# Patient Record
Sex: FEMALE | Race: WHITE | Employment: UNEMPLOYED | URBAN - METROPOLITAN AREA
[De-identification: names, ages, dates, MRNs, and addresses within clinical notes are randomized per-mention and may not be internally consistent; named-entity substitution may affect disease eponyms.]

---

## 2022-01-23 ENCOUNTER — HOSPITAL ENCOUNTER (EMERGENCY)
Age: 87
Discharge: HOME | End: 2022-01-23
Attending: EMERGENCY MEDICINE
Payer: MEDICARE

## 2022-01-23 ENCOUNTER — APPOINTMENT (OUTPATIENT)
Dept: CT IMAGING | Age: 87
End: 2022-01-23
Payer: MEDICARE

## 2022-01-23 ENCOUNTER — APPOINTMENT (OUTPATIENT)
Dept: GENERAL RADIOLOGY | Age: 87
End: 2022-01-23
Payer: MEDICARE

## 2022-01-23 VITALS
SYSTOLIC BLOOD PRESSURE: 90 MMHG | HEART RATE: 75 BPM | TEMPERATURE: 97.4 F | OXYGEN SATURATION: 93 % | RESPIRATION RATE: 22 BRPM | DIASTOLIC BLOOD PRESSURE: 47 MMHG

## 2022-01-23 DIAGNOSIS — N30.01 ACUTE CYSTITIS WITH HEMATURIA: Primary | ICD-10-CM

## 2022-01-23 LAB
ALBUMIN SERPL-MCNC: 3.1 G/DL (ref 3.4–5)
ALP BLD-CCNC: 181 U/L (ref 40–129)
ALT SERPL-CCNC: 10 U/L (ref 10–40)
ANION GAP SERPL CALCULATED.3IONS-SCNC: 17 MMOL/L (ref 3–16)
AST SERPL-CCNC: 19 U/L (ref 15–37)
BACTERIA: ABNORMAL /HPF
BASOPHILS ABSOLUTE: 0 K/UL (ref 0–0.2)
BASOPHILS RELATIVE PERCENT: 0.2 %
BILIRUB SERPL-MCNC: 0.4 MG/DL (ref 0–1)
BILIRUBIN DIRECT: <0.2 MG/DL (ref 0–0.3)
BILIRUBIN URINE: NEGATIVE
BILIRUBIN, INDIRECT: ABNORMAL MG/DL (ref 0–1)
BLOOD, URINE: ABNORMAL
BUN BLDV-MCNC: 15 MG/DL (ref 7–20)
C-REACTIVE PROTEIN: 54.4 MG/L (ref 0–5.1)
CALCIUM SERPL-MCNC: 8.4 MG/DL (ref 8.3–10.6)
CHLORIDE BLD-SCNC: 100 MMOL/L (ref 99–110)
CHP ED QC CHECK: NORMAL
CLARITY: CLEAR
CO2: 21 MMOL/L (ref 21–32)
COLOR: ABNORMAL
COMMENT UA: ABNORMAL
CREAT SERPL-MCNC: 1.1 MG/DL (ref 0.6–1.2)
EOSINOPHILS ABSOLUTE: 0.1 K/UL (ref 0–0.6)
EOSINOPHILS RELATIVE PERCENT: 0.7 %
EPITHELIAL CELLS, UA: ABNORMAL /HPF (ref 0–5)
GFR AFRICAN AMERICAN: 56
GFR NON-AFRICAN AMERICAN: 46
GLUCOSE BLD-MCNC: 114 MG/DL
GLUCOSE BLD-MCNC: 114 MG/DL (ref 70–99)
GLUCOSE BLD-MCNC: 169 MG/DL (ref 70–99)
GLUCOSE URINE: NEGATIVE MG/DL
HCT VFR BLD CALC: 30.8 % (ref 36–48)
HEMOGLOBIN: 9.1 G/DL (ref 12–16)
INR BLD: 1.15 (ref 0.88–1.12)
KETONES, URINE: NEGATIVE MG/DL
LACTIC ACID: 3.4 MMOL/L (ref 0.4–2)
LEUKOCYTE ESTERASE, URINE: ABNORMAL
LIPASE: 8 U/L (ref 13–60)
LYMPHOCYTES ABSOLUTE: 1.2 K/UL (ref 1–5.1)
LYMPHOCYTES RELATIVE PERCENT: 15.7 %
MAGNESIUM: 1.6 MG/DL (ref 1.8–2.4)
MCH RBC QN AUTO: 21 PG (ref 26–34)
MCHC RBC AUTO-ENTMCNC: 29.5 G/DL (ref 31–36)
MCV RBC AUTO: 71.4 FL (ref 80–100)
MICROSCOPIC EXAMINATION: YES
MONOCYTES ABSOLUTE: 0.2 K/UL (ref 0–1.3)
MONOCYTES RELATIVE PERCENT: 3.1 %
NEUTROPHILS ABSOLUTE: 6.2 K/UL (ref 1.7–7.7)
NEUTROPHILS RELATIVE PERCENT: 80.3 %
NITRITE, URINE: POSITIVE
PDW BLD-RTO: 16.9 % (ref 12.4–15.4)
PERFORMED ON: ABNORMAL
PH UA: 6 (ref 5–8)
PLATELET # BLD: 349 K/UL (ref 135–450)
PMV BLD AUTO: 7.7 FL (ref 5–10.5)
POTASSIUM REFLEX MAGNESIUM: 3.1 MMOL/L (ref 3.5–5.1)
PRO-BNP: 754 PG/ML (ref 0–449)
PROCALCITONIN: 0.09 NG/ML (ref 0–0.15)
PROTEIN UA: ABNORMAL MG/DL
PROTHROMBIN TIME: 13.1 SEC (ref 9.9–12.7)
RAPID INFLUENZA  B AGN: NEGATIVE
RAPID INFLUENZA A AGN: NEGATIVE
RBC # BLD: 4.32 M/UL (ref 4–5.2)
RBC UA: ABNORMAL /HPF (ref 0–4)
REASON FOR REJECTION: NORMAL
REJECTED TEST: NORMAL
RENAL EPITHELIAL, UA: ABNORMAL /HPF (ref 0–1)
SARS-COV-2, NAAT: NOT DETECTED
SEDIMENTATION RATE, ERYTHROCYTE: 86 MM/HR (ref 0–30)
SODIUM BLD-SCNC: 138 MMOL/L (ref 136–145)
SPECIFIC GRAVITY UA: 1.01 (ref 1–1.03)
TOTAL PROTEIN: 6.8 G/DL (ref 6.4–8.2)
TROPONIN: <0.01 NG/ML
URINE REFLEX TO CULTURE: YES
URINE TYPE: ABNORMAL
UROBILINOGEN, URINE: 1 E.U./DL
WBC # BLD: 7.7 K/UL (ref 4–11)
WBC UA: ABNORMAL /HPF (ref 0–5)

## 2022-01-23 PROCEDURE — 71045 X-RAY EXAM CHEST 1 VIEW: CPT

## 2022-01-23 PROCEDURE — 84145 PROCALCITONIN (PCT): CPT

## 2022-01-23 PROCEDURE — 96375 TX/PRO/DX INJ NEW DRUG ADDON: CPT

## 2022-01-23 PROCEDURE — 96365 THER/PROPH/DIAG IV INF INIT: CPT

## 2022-01-23 PROCEDURE — 83605 ASSAY OF LACTIC ACID: CPT

## 2022-01-23 PROCEDURE — 81001 URINALYSIS AUTO W/SCOPE: CPT

## 2022-01-23 PROCEDURE — 70450 CT HEAD/BRAIN W/O DYE: CPT

## 2022-01-23 PROCEDURE — 80076 HEPATIC FUNCTION PANEL: CPT

## 2022-01-23 PROCEDURE — 86140 C-REACTIVE PROTEIN: CPT

## 2022-01-23 PROCEDURE — 87086 URINE CULTURE/COLONY COUNT: CPT

## 2022-01-23 PROCEDURE — 87186 SC STD MICRODIL/AGAR DIL: CPT

## 2022-01-23 PROCEDURE — 87088 URINE BACTERIA CULTURE: CPT

## 2022-01-23 PROCEDURE — 2580000003 HC RX 258: Performed by: PHYSICIAN ASSISTANT

## 2022-01-23 PROCEDURE — 80048 BASIC METABOLIC PNL TOTAL CA: CPT

## 2022-01-23 PROCEDURE — 36415 COLL VENOUS BLD VENIPUNCTURE: CPT

## 2022-01-23 PROCEDURE — 93005 ELECTROCARDIOGRAM TRACING: CPT | Performed by: PHYSICIAN ASSISTANT

## 2022-01-23 PROCEDURE — 6360000002 HC RX W HCPCS: Performed by: PHYSICIAN ASSISTANT

## 2022-01-23 PROCEDURE — 85610 PROTHROMBIN TIME: CPT

## 2022-01-23 PROCEDURE — 83735 ASSAY OF MAGNESIUM: CPT

## 2022-01-23 PROCEDURE — 84484 ASSAY OF TROPONIN QUANT: CPT

## 2022-01-23 PROCEDURE — 72125 CT NECK SPINE W/O DYE: CPT

## 2022-01-23 PROCEDURE — 85652 RBC SED RATE AUTOMATED: CPT

## 2022-01-23 PROCEDURE — 83690 ASSAY OF LIPASE: CPT

## 2022-01-23 PROCEDURE — 87040 BLOOD CULTURE FOR BACTERIA: CPT

## 2022-01-23 PROCEDURE — 85025 COMPLETE CBC W/AUTO DIFF WBC: CPT

## 2022-01-23 PROCEDURE — 96366 THER/PROPH/DIAG IV INF ADDON: CPT

## 2022-01-23 PROCEDURE — 99284 EMERGENCY DEPT VISIT MOD MDM: CPT

## 2022-01-23 PROCEDURE — 96367 TX/PROPH/DG ADDL SEQ IV INF: CPT

## 2022-01-23 PROCEDURE — 87635 SARS-COV-2 COVID-19 AMP PRB: CPT

## 2022-01-23 PROCEDURE — 6370000000 HC RX 637 (ALT 250 FOR IP): Performed by: PHYSICIAN ASSISTANT

## 2022-01-23 PROCEDURE — 87804 INFLUENZA ASSAY W/OPTIC: CPT

## 2022-01-23 PROCEDURE — 83880 ASSAY OF NATRIURETIC PEPTIDE: CPT

## 2022-01-23 RX ORDER — MAGNESIUM SULFATE IN WATER 40 MG/ML
2000 INJECTION, SOLUTION INTRAVENOUS ONCE
Status: COMPLETED | OUTPATIENT
Start: 2022-01-23 | End: 2022-01-23

## 2022-01-23 RX ORDER — LORAZEPAM 2 MG/ML
0.5 INJECTION INTRAMUSCULAR ONCE
Status: COMPLETED | OUTPATIENT
Start: 2022-01-23 | End: 2022-01-23

## 2022-01-23 RX ORDER — CEFUROXIME AXETIL 250 MG/1
250 TABLET ORAL 2 TIMES DAILY
Qty: 20 TABLET | Refills: 0 | Status: SHIPPED | OUTPATIENT
Start: 2022-01-23 | End: 2022-02-02

## 2022-01-23 RX ADMIN — LORAZEPAM 0.5 MG: 2 INJECTION INTRAMUSCULAR; INTRAVENOUS at 18:52

## 2022-01-23 RX ADMIN — CEFTRIAXONE 1000 MG: 1 INJECTION, POWDER, FOR SOLUTION INTRAMUSCULAR; INTRAVENOUS at 17:12

## 2022-01-23 RX ADMIN — MAGNESIUM SULFATE HEPTAHYDRATE 2000 MG: 40 INJECTION, SOLUTION INTRAVENOUS at 17:57

## 2022-01-23 RX ADMIN — POTASSIUM BICARBONATE 40 MEQ: 782 TABLET, EFFERVESCENT ORAL at 18:07

## 2022-01-23 NOTE — ED PROVIDER NOTES
I independently evaluated and obtained a history and physical on Alexander Rodriguez. All diagnostic, treatment, and disposition assistants were made to myself in conjunction the advanced practice provider. For further details of this patient's emergency department encounter, please see the advanced practice provider's documentation. History: 68-year-old female with history of dementia presents from her nursing facility, [facility], for evaluation of fall that was unwitnessed. Patient also more confused than her normal baseline with dementia. Patient's son-in-law with her at bedside. Patient son-in-law states he is concerned that she may have a urinary tract infection. He states the patient is a DNR CC. Inda Brittle Physician Exam: Alert to person but not place or time, normocephalic, right forehead hematoma, no lacerations, moist mucous membranes, no hemotympanum or signs of basilar skull fracture, regular rate and rhythm, lungs clear auscultation bilaterally, abdomen soft nontender nondistended, no midline tenderness of the cervical, thoracic or lumbar spine, skin warm and dry      The Ekg interpreted by me shows  Normal sinus rhythm with a rate of 95, normal axis, , QRS 94, QTc 459, no ST elevations, nonspecific ST changes with ST depression in the anterior lateral leads and inferior leads, no evidence of acute STEMI, no prior EKG on file. Patient seen and evaluated. History and physical as above. Nontoxic and afebrile. Patient arrives for evaluation of worsening confusion and a possible fall. CT head unremarkable. Does have hematoma to forehead but no wounds that require repair. Covid negative, flu negative. Magnesium 1.6 and was repleted here in the emergency room. Potassium 3.1 and also was repleted. Lactic acid elevated at 3.4. I did see this patient with GAETANO after the work-up was completed.   Do not feel patient warrants admission as she is DNR CC and currently has treatment at an [facility] who can provide antibiotics for her UTI. Patient unfortunately had full work-up done despite being DNR CC but this was performed prior to my evaluation. Plan for discharge back to nursing facility with antibiotics.      Hayder Galloway MD  01/24/22 1929

## 2022-01-23 NOTE — ED NOTES
[facility] Paperwork-- Pt brought in from [facility] ([phone]). Per report, unwitnessed fall, ? Head injury. Report of new onset N/V/D since fall. Tested + COVID 1/11; out of isolation 1/21; PCR negative 1/22. Pt independently ambulates with walker at [facility] [place].       Kirit Rosen RN  01/23/22 0771

## 2022-01-23 NOTE — ED PROVIDER NOTES
[address] [facility]        Pt Name: Omid Jo  MRN: [id number]  [place] 10/20/1929  Date of evaluation: 1/23/2022  Provider: Casandra Lara PA-C  PCP: Cyndi Merlin  Note Started: 5:04 PM EST        I have seen and evaluated this patient with my supervising physician Jennifer Cardona MD.    Steven Danny       Chief Complaint   Patient presents with    Fall     pt from [facility] had unwitnessed fall at approx 1400 today    Altered Mental Status     pt has dementia but if altered from baseline today        HISTORY OF PRESENT ILLNESS   (Location, Timing/Onset, Context/Setting, Quality, Duration, Modifying Factors, Severity, Associated Signs and Symptoms)  Note limiting factors. Chief Complaint: Fall, slight altered from baseline with known dementia    Omid Jo is a 80 y.o. female who presents by EMS from [address] [place] [address]. Apparently had unwitnessed fall occurring about 2 PM this afternoon. They believe she is a bit altered from her baseline of dementia. However, the patient's son-in-law in room and does not indicate change from her baseline dementia. Patient however did experience a fall unwitnessed. This patient is a DNR CC. She does ambulate independently with a walker. Son-in-law indicates December while at [facility] she underwent transfusion for anemia of unknown etiology. Patient was COVID-positive at the [facility] on January 11. Out of isolation in January 21 had a PCR COVID study which resulted as negative on January 22. Nursing Notes were all reviewed and agreed with or any disagreements were addressed in the HPI. REVIEW OF SYSTEMS    (2-9 systems for level 4, 10 or more for level 5)     Review of Systems    Positives and Pertinent negatives as per HPI. Except as noted above in the ROS, all other systems were reviewed and negative.        PAST MEDICAL HISTORY   No past medical history on file. SURGICAL HISTORY   No past surgical history on file. [address]       Discharge Medication List as of 1/23/2022  9:00 PM            ALLERGIES     Nalfon [fenoprofen]    FAMILYHISTORY     No family history on file. SOCIAL HISTORY       Social History     Tobacco Use    Smoking status: Not on file    Smokeless tobacco: Not on file   Substance Use Topics    Alcohol use: Not on file    Drug use: Not on file       SCREENINGS    Sade Coma Scale  Eye Opening: To speech  Best Verbal Response: Confused  Best Motor Response: Obeys commands  [place] Coma Scale Score: 13        PHYSICAL EXAM    (up to 7 for level 4, 8 or more for level 5)     ED Triage Vitals [01/23/22 1547]   BP Temp Temp Source Pulse Resp SpO2 Height Weight   (!) 121/55 97.4 °F (36.3 °C) Oral 94 22 95 % -- --       Physical Exam  Vitals and nursing note reviewed. Constitutional:       Appearance: Normal appearance. She is well-developed and normal weight. HENT:      Head: Normocephalic and atraumatic. Right Ear: External ear normal.      Left Ear: External ear normal.   Eyes:      General: No scleral icterus. Right eye: No discharge. Left eye: No discharge. Conjunctiva/sclera: Conjunctivae normal.   Cardiovascular:      Rate and Rhythm: Normal rate and regular rhythm. Heart sounds: Normal heart sounds. Pulmonary:      Effort: Pulmonary effort is normal.      Breath sounds: Normal breath sounds. Abdominal:      General: Abdomen is flat. Bowel sounds are normal.      Palpations: Abdomen is soft. Musculoskeletal:         General: Normal range of motion. Cervical back: Normal range of motion and neck supple. Right lower leg: No edema. Left lower leg: No edema. Skin:     General: Skin is warm and dry. Neurological:      General: No focal deficit present. Mental Status: She is alert. Mental status is at baseline.    Psychiatric:         Mood and Affect: Mood normal.         Behavior: Behavior normal.         DIAGNOSTIC RESULTS   LABS:    Labs Reviewed   CBC WITH AUTO DIFFERENTIAL - Abnormal; Notable for the following components:       Result Value    Hemoglobin 9.1 (*)     Hematocrit 30.8 (*)     MCV 71.4 (*)     MCH 21.0 (*)     MCHC 29.5 (*)     RDW 16.9 (*)     All other components within normal limits    Narrative:     no blue top sent lav  Performed at:  [address] [place] [address]   Phone [phone]   BASIC METABOLIC PANEL W/ REFLEX TO MG FOR LOW K - Abnormal; Notable for the following components:    Potassium reflex Magnesium 3.1 (*)     Anion Gap 17 (*)     Glucose 169 (*)     GFR Non- 46 (*)     GFR  56 (*)     All other components within normal limits    Narrative:     CALL  Sandro Noonan [phone],  Rejected Test Name/Called to: Anna Riggs, 01/23/2022 15:02, by Aren Atkins  no blue top sent lav  Performed at:  [address] [place] [address]   Phone [phone]   HEPATIC FUNCTION PANEL - Abnormal; Notable for the following components:    Albumin 3.1 (*)     Alkaline Phosphatase 181 (*)     All other components within normal limits    Narrative:     Bonilla Forbes tel. [phone],  Rejected Test Name/Called to: Anna Riggs, 01/23/2022 15:02, by Aren Atkins  no blue top sent lav  Performed at:  [address] [facility] [place] [address]   Phone [phone]   LIPASE - Abnormal; Notable for the following components:    Lipase 8.0 (*)     All other components within normal limits    Narrative:     Bonilla Forbes tel. [phone],  Rejected Test Name/Called to: Anna Riggs, 01/23/2022 15:02, by Aren Atkins  no blue top sent lav  Performed at:  [address] [facility] [place] [address]   Phone [phone]   BRAIN NATRIURETIC PEPTIDE - Abnormal; Notable for the following components:    Pro- (*)     All other components within normal limits    Narrative:     Mervin Mercado tel. [phone],  Rejected Test Name/Called to: Pierre Moseley, 01/23/2022 15:02, by Lit Oz  no blue top sent lav  [address]formed [address]:  [address] [address]   Phone [phone]   C-REACTIVE PROTEIN - Abnormal; Notable for the following components:    CRP 54.4 (*)     All other components within normal limits    Narrative:     Mervin Mercado tel. [phone],  Rejected Test Name/Called to: Pierre Moseley, 01/23/2022 15:02, by [place]  no blue top sent lav  Performed at:  [address] [address]   Phone [phone]   URINE RT REFLEX TO CULTURE - Abnormal; Notable for the following components:    Blood, Urine TRACE-LYSED (*)     Protein, UA TRACE (*)     Nitrite, Urine POSITIVE (*)     Leukocyte Esterase, Urine SMALL (*)     All other components within normal limits    Narrative:     Performed at:  [address] [address]   Phone [phone]   LACTIC ACID, PLASMA - Abnormal; Notable for the following components:    Lactic Acid 3.4 (*)     All other components within normal limits    Narrative:     Performed at:  [address] [address]   Phone [phone]   SEDIMENTATION RATE - Abnormal; Notable for the following components:    Sed Rate 86 (*)     All other components within normal limits    Narrative:     Performed at:  [address] [address]   Phone [phone]   PROTIME-INR - Abnormal; Notable for the following components:    Protime 13.1 (*)     INR 1.15 (*)     All other components within normal limits    Narrative:     Previous sample was short, QNS  Lab draw  Performed at:  [address] [place]   Phone [phone]   MAGNESIUM - Abnormal; Notable for the following components:    Magnesium 1.60 (*)     All other components within normal limits    Narrative:     Jd Curran tel. [phone],  Rejected Test Name/Called to: Cheri Palumbo, 01/23/2022 15:02, by [employer]  no blue top sent lav  Performed at:  [address] [place]   Phone [phone]   MICROSCOPIC URINALYSIS - Abnormal; Notable for the following components:    WBC, UA 10-20 (*)     Epithelial Cells, UA 6-10 (*)     Bacteria, UA 4+ (*)     All other components within normal limits    Narrative:     Performed at:  [address] [place]   Phone [phone]   POCT GLUCOSE - Abnormal; Notable for the following components:    POC Glucose 114 (*)     All other components within normal limits    Narrative:     Performed at:  [address] [place]   Phone [phone]   POCT GLUCOSE - Normal   COVID-19, RAPID    Narrative:     Performed at:  [address] [place]   Phone [phone]   RAPID INFLUENZA A/B ANTIGENS    Narrative:     Performed at:  [address] [place]   Phone [phone]   CULTURE, BLOOD 1   CULTURE, BLOOD 2   CULTURE, URINE   TROPONIN    Narrative:     [address] [phone],  Rejected Test Name/Called to: Cheri Palumbo, 01/23/2022 15:02, by [employer]CE  no blue top sent lav  Performed at:  [address] [place]   Phone [phone]   PROCALCITONIN Narrative:     Brett Nick tel. [phone],  Rejected Test Name/Called to: Anette Ramírez, 01/23/2022 15:02, by Melida Matias  no blue top sent lav  Performed at:  [facility]  [address] [place], [place] [address]   Phone [phone]   SPECIMEN REJECTION    Narrative:     no blue top sent lav  Performed at:  [facility]  [address] [place], [place] [address]   Phone [phone]       When ordered only abnormal lab results are displayed. All other labs were within normal range or not returned as of this dictation. EKG: When ordered, EKG's are interpreted by the Emergency Department Physician in the absence of a cardiologist.  Please see their note for interpretation of EKG. RADIOLOGY:   Non-plain film images such as CT, Ultrasound and MRI are read by the radiologist. Plain radiographic images are visualized and preliminarily interpreted by the ED Provider with the below findings:        Interpretation per the Radiologist below, if available at the time of this note:    CT Head WO Contrast   Final Result   No acute intracranial abnormality. Generalized cerebral atrophy. Mild chronic small vessel white matter   ischemic change. CT Cervical Spine WO Contrast   Final Result   No acute abnormality of the cervical spine. Multilevel degenerative disc disease and multilevel facet arthropathy. Focal opacification in the right upper lobe, likely scarring. Comparison   with remote imaging would be helpful if available. Enlarged multinodular thyroid gland. Consider ultrasound follow-up given   patient's age. XR CHEST PORTABLE   Final Result   No acute abnormality.            CT Head WO Contrast    Result Date: 1/23/2022  EXAMINATION: CT OF THE HEAD WITHOUT CONTRAST  1/23/2022 3:23 pm TECHNIQUE: CT of the head was performed without the administration of intravenous contrast. Dose modulation, iterative reconstruction, and/or weight based adjustment of the mA/kV was utilized to reduce the radiation dose to as low as reasonably achievable. COMPARISON: None. HISTORY: ORDERING SYSTEM PROVIDED HISTORY: Altered mental status, history fall TECHNOLOGIST PROVIDED HISTORY: Reason for exam:->Altered mental status, history fall Has a \"code stroke\" or \"stroke alert\" been called? ->No Decision Support Exception - unselect if not a suspected or confirmed emergency medical condition->Emergency Medical Condition (MA) FINDINGS: BRAIN/VENTRICLES: There is no acute intracranial hemorrhage, mass effect or midline shift. No abnormal extra-axial fluid collection. The gray-white differentiation is maintained without evidence of an acute infarct. There is no evidence of hydrocephalus. Generalized prominence of the ventricular and cisternal spaces. Mild decreased attenuation in the periventricular and subcortical white matter most consistent with small vessel ischemic change. ORBITS: The visualized portion of the orbits demonstrate no acute abnormality. SINUSES: The visualized paranasal sinuses and mastoid air cells demonstrate no acute abnormality. SOFT TISSUES/SKULL:  No acute abnormality of the visualized skull or soft tissues. No acute intracranial abnormality. Generalized cerebral atrophy. Mild chronic small vessel white matter ischemic change. CT Cervical Spine WO Contrast    Result Date: 1/23/2022  EXAMINATION: CT OF THE CERVICAL SPINE WITHOUT CONTRAST 1/23/2022 3:23 pm TECHNIQUE: CT of the cervical spine was performed without the administration of intravenous contrast. Multiplanar reformatted images are provided for review. Dose modulation, iterative reconstruction, and/or weight based adjustment of the mA/kV was utilized to reduce the radiation dose to as low as reasonably achievable. COMPARISON: None.  HISTORY: ORDERING SYSTEM PROVIDED HISTORY: Altered mental status history of fall TECHNOLOGIST PROVIDED HISTORY: Reason for exam:->Altered mental status history of fall Decision Support Exception - unselect if not a suspected or confirmed emergency medical condition->Emergency Medical Condition (MA) FINDINGS: BONES/ALIGNMENT: There is no acute fracture or traumatic malalignment. DEGENERATIVE CHANGES: Straightened cervical lordotic curvature. Multilevel degenerative disc disease, most advanced at C4-5 through C6-7. Multilevel facet arthropathy. 3 mm anterolisthesis of C3 on C4, likely related to facet arthropathy. Mild degenerative change at the C1-2 articulation anteriorly. SOFT TISSUES: There is no prevertebral soft tissue swelling. Enlarged multinodular thyroid gland. Focal right upper lobe opacification most consistent with an area of scarring. No acute abnormality of the cervical spine. Multilevel degenerative disc disease and multilevel facet arthropathy. Focal opacification in the right upper lobe, likely scarring. Comparison with remote imaging would be helpful if available. Enlarged multinodular thyroid gland. Consider ultrasound follow-up given patient's age. XR CHEST PORTABLE    Result Date: 1/23/2022  EXAMINATION: ONE XRAY VIEW OF THE CHEST 1/23/2022 2:05 pm COMPARISON: None. HISTORY: ORDERING SYSTEM PROVIDED HISTORY: Altered mental status TECHNOLOGIST PROVIDED HISTORY: Reason for exam:->Altered mental status Reason for Exam: Altered mental status FINDINGS: No lung infiltrate or consolidation. No pneumothorax or pleural effusion. Heart size is normal.     No acute abnormality. PROCEDURES   Unless otherwise noted below, none     Procedures    CRITICAL CARE TIME   Critical Care  There was a high probability of life-threatening clinical deterioration in the patient's condition requiring my urgent intervention. Total critical care time with the patient was 45 minutes excluding separately reportable procedures.   Critical care required due to patients complaint per [facility]CF of altered mental status beyond her baseline dementia. Later confirmed at baseline per son. Patient found to have UTI with associated hypomagnesemia. CONSULTS:  None      EMERGENCY DEPARTMENT COURSE and DIFFERENTIAL DIAGNOSIS/MDM:   Vitals:    Vitals:    01/23/22 1547 01/23/22 2115   BP: (!) 121/55 (!) 90/47   Pulse: 94 75   Resp: 22 22   Temp: 97.4 °F (36.3 °C)    TempSrc: Oral    SpO2: 95% 93%       Patient was given the following medications:  Medications   cefTRIAXone (ROCEPHIN) 1000 mg IVPB in 50 mL D5W minibag (0 mg IntraVENous Stopped 1/23/22 1750)   magnesium sulfate 2000 mg in 50 mL IVPB premix (0 mg IntraVENous Stopped 1/23/22 2000)   LORazepam (ATIVAN) injection 0.5 mg (0.5 mg IntraVENous Given 1/23/22 1852)           Presenting with history of unwitnessed fall. Walks independently with a walker. Found down with walker. CT scan head and neck unremarkable. Chest x-ray negative for acute cardiopulmonary abnormality. Laboratory evaluation showing evidence of UTI with positive nitrate and small leukocyte. Did treat with Rocephin 1 g IVPB. Rapid Covid and rapid flu studies are both negative. The patient WBC 7.7 hemoglobin 9.1. Hemoglobin is at baseline. Patient renal function at baseline with a BUN 15, creatinine 1.1 and GFR 46. The patient lactic acid 3.4 likely related to the UTI which is being treated. Troponin BNP appropriate and not elevated. This patient is DNR CC. Patient at mental baseline. Patient becoming bit anxious with concern for sundowners. Ativan 0.5 mg ordered. Patient be transported back to [facility] with continuation of antibiotics. Patient will be discharged with prescription for Ceftin 250 mg twice daily x10 days. The patient's son-in-law does express understanding of the diagnosis and the treatment plan. This case was reviewed with attending physician who personally evaluated the patient. FINAL IMPRESSION      1.  Acute cystitis with hematuria          DISPOSITION/PLAN   DISPOSITION Decision To Discharge 01/23/2022 07:20:25 PM      PATIENT REFERRED TO:  Mike Winston  [address]  [address]  [address] [facility]  [phone]    Schedule an appointment as soon as possible for a visit in 1 week      [facility] Emergency Department  [address] [id number]  [phone]  Go to   If symptoms worsen      DISCHARGE MEDICATIONS:  Discharge Medication List as of 1/23/2022  9:00 PM      START taking these medications    Details   cefUROXime (CEFTIN) 250 MG tablet Take 1 tablet by mouth 2 times daily for 10 days, Disp-20 tablet, R-0Print             DISCONTINUED MEDICATIONS:  Discharge Medication List as of 1/23/2022  9:00 PM                 (Please note that portions of this note were completed with a voice recognition program.  Efforts were made to edit the dictations but occasionally words are mis-transcribed. )    Casandra Lara PA-C (electronically signed)           Casandra Lara PA-C  01/24/22 1249

## 2022-01-24 LAB
EKG ATRIAL RATE: 95 BPM
EKG DIAGNOSIS: NORMAL
EKG P AXIS: 85 DEGREES
EKG P-R INTERVAL: 184 MS
EKG Q-T INTERVAL: 366 MS
EKG QRS DURATION: 94 MS
EKG QTC CALCULATION (BAZETT): 459 MS
EKG R AXIS: 49 DEGREES
EKG T AXIS: 108 DEGREES
EKG VENTRICULAR RATE: 95 BPM

## 2022-01-24 PROCEDURE — 93010 ELECTROCARDIOGRAM REPORT: CPT | Performed by: INTERNAL MEDICINE

## 2022-01-24 NOTE — ED NOTES
Attempted to contact [facility] [place] at [phone] to give report but it says that the line in temporarily unavailable. Report given to [employer] along with d/c instructions and rx.       Gabriela Rosas RN  01/23/22 7753

## 2022-01-25 LAB
ORGANISM: ABNORMAL
URINE CULTURE, ROUTINE: ABNORMAL
URINE CULTURE, ROUTINE: ABNORMAL

## 2022-01-25 NOTE — RESULT ENCOUNTER NOTE
Culture reviewed, culture is positive and unfortunately E. coli resistant to the p.o. antibiotic the patient was prescribed at discharge is present in culture. The patient is currently DNR comfort care and was discharged to a long-term facility. That facility should be called and advised the patient has an antibiotic requiring broad-based IV antibiotics such as gentamicin or ertapenem. If that facility is able to give this patient IV antibiotics such as gentamicin or ertapenem she may remain at that facility however if they are unable to give these IV antibiotics the patient and family should consider coming back to the emergency department for appropriate IV antibiotic treatment.

## 2022-01-25 NOTE — RESULT ENCOUNTER NOTE
Patient's positive result has been appropriately evaluated by the provider pool. Patient was unable to be reached over the phone. A voicemail was left with the patient. Will await a return phone call. Will try to reach patient again tomorrow per protocol. Called nursing home x2 once was got disconnected. Second call no one answered.

## 2022-01-26 NOTE — RESULT ENCOUNTER NOTE
Patient's positive result has been appropriately evaluated by the provider pool. GERARDO Becker called for information regarding urine culture.   Information sent to NP at [phone]

## 2022-01-27 LAB
BLOOD CULTURE, ROUTINE: NORMAL
CULTURE, BLOOD 2: NORMAL

## 2022-02-16 ENCOUNTER — HOSPITAL ENCOUNTER (OUTPATIENT)
Age: 87
Setting detail: OBSERVATION
Discharge: OTHER INSTITUTION | End: 2022-02-17
Attending: EMERGENCY MEDICINE | Admitting: INTERNAL MEDICINE
Payer: MEDICARE

## 2022-02-16 ENCOUNTER — APPOINTMENT (OUTPATIENT)
Dept: CT IMAGING | Age: 87
End: 2022-02-16
Payer: MEDICARE

## 2022-02-16 DIAGNOSIS — E86.0 DEHYDRATION: ICD-10-CM

## 2022-02-16 DIAGNOSIS — N17.9 ACUTE KIDNEY INJURY (HCC): ICD-10-CM

## 2022-02-16 DIAGNOSIS — Z51.5 HOSPICE CARE: ICD-10-CM

## 2022-02-16 DIAGNOSIS — K56.609 SMALL BOWEL OBSTRUCTION (HCC): Primary | ICD-10-CM

## 2022-02-16 DIAGNOSIS — N39.0 URINARY TRACT INFECTION WITHOUT HEMATURIA, SITE UNSPECIFIED: ICD-10-CM

## 2022-02-16 DIAGNOSIS — K63.89 COLONIC MASS: ICD-10-CM

## 2022-02-16 DIAGNOSIS — R16.0 LIVER MASS: ICD-10-CM

## 2022-02-16 LAB
ALBUMIN SERPL-MCNC: 3.1 G/DL (ref 3.4–5)
ALP BLD-CCNC: 229 U/L (ref 40–129)
ALT SERPL-CCNC: 11 U/L (ref 10–40)
ANION GAP SERPL CALCULATED.3IONS-SCNC: 17 MMOL/L (ref 3–16)
AST SERPL-CCNC: 21 U/L (ref 15–37)
BASOPHILS ABSOLUTE: 0 K/UL (ref 0–0.2)
BASOPHILS RELATIVE PERCENT: 0.3 %
BILIRUB SERPL-MCNC: 0.5 MG/DL (ref 0–1)
BILIRUBIN DIRECT: <0.2 MG/DL (ref 0–0.3)
BILIRUBIN URINE: ABNORMAL
BILIRUBIN, INDIRECT: ABNORMAL MG/DL (ref 0–1)
BLOOD, URINE: NEGATIVE
BUN BLDV-MCNC: 92 MG/DL (ref 7–20)
CALCIUM SERPL-MCNC: 8.3 MG/DL (ref 8.3–10.6)
CHLORIDE BLD-SCNC: 98 MMOL/L (ref 99–110)
CLARITY: ABNORMAL
CO2: 23 MMOL/L (ref 21–32)
COARSE CASTS, UA: ABNORMAL /LPF (ref 0–2)
COLOR: YELLOW
CREAT SERPL-MCNC: 2.7 MG/DL (ref 0.6–1.2)
EOSINOPHILS ABSOLUTE: 0 K/UL (ref 0–0.6)
EOSINOPHILS RELATIVE PERCENT: 0.1 %
EPITHELIAL CELLS, UA: 3 /HPF (ref 0–5)
GFR AFRICAN AMERICAN: 20
GFR NON-AFRICAN AMERICAN: 16
GLUCOSE BLD-MCNC: 120 MG/DL
GLUCOSE BLD-MCNC: 120 MG/DL (ref 70–99)
GLUCOSE BLD-MCNC: 130 MG/DL (ref 70–99)
GLUCOSE URINE: NEGATIVE MG/DL
HCT VFR BLD CALC: 36.3 % (ref 36–48)
HEMOGLOBIN: 11.3 G/DL (ref 12–16)
KETONES, URINE: NEGATIVE MG/DL
LACTIC ACID, SEPSIS: 2.9 MMOL/L (ref 0.4–1.9)
LEUKOCYTE ESTERASE, URINE: ABNORMAL
LIPASE: 9 U/L (ref 13–60)
LYMPHOCYTES ABSOLUTE: 2 K/UL (ref 1–5.1)
LYMPHOCYTES RELATIVE PERCENT: 12.4 %
MCH RBC QN AUTO: 21.9 PG (ref 26–34)
MCHC RBC AUTO-ENTMCNC: 31.2 G/DL (ref 31–36)
MCV RBC AUTO: 70.3 FL (ref 80–100)
MICROSCOPIC EXAMINATION: YES
MONOCYTES ABSOLUTE: 0.8 K/UL (ref 0–1.3)
MONOCYTES RELATIVE PERCENT: 5.2 %
NEUTROPHILS ABSOLUTE: 13 K/UL (ref 1.7–7.7)
NEUTROPHILS RELATIVE PERCENT: 82 %
NITRITE, URINE: NEGATIVE
PDW BLD-RTO: 21.1 % (ref 12.4–15.4)
PERFORMED ON: ABNORMAL
PH UA: 5 (ref 5–8)
PLATELET # BLD: 490 K/UL (ref 135–450)
PMV BLD AUTO: 8.3 FL (ref 5–10.5)
POTASSIUM REFLEX MAGNESIUM: 4.3 MMOL/L (ref 3.5–5.1)
PROTEIN UA: NEGATIVE MG/DL
RBC # BLD: 5.17 M/UL (ref 4–5.2)
RBC UA: ABNORMAL /HPF (ref 0–4)
SODIUM BLD-SCNC: 138 MMOL/L (ref 136–145)
SPECIFIC GRAVITY UA: 1.02 (ref 1–1.03)
TOTAL PROTEIN: 7.3 G/DL (ref 6.4–8.2)
TROPONIN: 0.02 NG/ML
URINE REFLEX TO CULTURE: YES
URINE TYPE: ABNORMAL
UROBILINOGEN, URINE: 1 E.U./DL
WBC # BLD: 15.9 K/UL (ref 4–11)
WBC UA: 39 /HPF (ref 0–5)
YEAST: PRESENT /HPF

## 2022-02-16 PROCEDURE — 99283 EMERGENCY DEPT VISIT LOW MDM: CPT

## 2022-02-16 PROCEDURE — 74176 CT ABD & PELVIS W/O CONTRAST: CPT

## 2022-02-16 PROCEDURE — 93005 ELECTROCARDIOGRAM TRACING: CPT

## 2022-02-16 PROCEDURE — 96375 TX/PRO/DX INJ NEW DRUG ADDON: CPT

## 2022-02-16 PROCEDURE — 84484 ASSAY OF TROPONIN QUANT: CPT

## 2022-02-16 PROCEDURE — 6360000002 HC RX W HCPCS

## 2022-02-16 PROCEDURE — 87040 BLOOD CULTURE FOR BACTERIA: CPT

## 2022-02-16 PROCEDURE — 2580000003 HC RX 258

## 2022-02-16 PROCEDURE — 83690 ASSAY OF LIPASE: CPT

## 2022-02-16 PROCEDURE — 96365 THER/PROPH/DIAG IV INF INIT: CPT

## 2022-02-16 PROCEDURE — G0378 HOSPITAL OBSERVATION PER HR: HCPCS

## 2022-02-16 PROCEDURE — 96361 HYDRATE IV INFUSION ADD-ON: CPT

## 2022-02-16 PROCEDURE — 83605 ASSAY OF LACTIC ACID: CPT

## 2022-02-16 PROCEDURE — 81001 URINALYSIS AUTO W/SCOPE: CPT

## 2022-02-16 PROCEDURE — 70450 CT HEAD/BRAIN W/O DYE: CPT

## 2022-02-16 PROCEDURE — 87086 URINE CULTURE/COLONY COUNT: CPT

## 2022-02-16 PROCEDURE — 80076 HEPATIC FUNCTION PANEL: CPT

## 2022-02-16 PROCEDURE — 80048 BASIC METABOLIC PNL TOTAL CA: CPT

## 2022-02-16 PROCEDURE — 85025 COMPLETE CBC W/AUTO DIFF WBC: CPT

## 2022-02-16 PROCEDURE — 2580000003 HC RX 258: Performed by: INTERNAL MEDICINE

## 2022-02-16 RX ORDER — M-VIT,TX,IRON,MINS/CALC/FOLIC 27MG-0.4MG
1 TABLET ORAL DAILY
Status: ON HOLD | COMMUNITY
End: 2022-02-17 | Stop reason: HOSPADM

## 2022-02-16 RX ORDER — FENTANYL CITRATE 50 UG/ML
25 INJECTION, SOLUTION INTRAMUSCULAR; INTRAVENOUS ONCE
Status: COMPLETED | OUTPATIENT
Start: 2022-02-16 | End: 2022-02-16

## 2022-02-16 RX ORDER — LOPERAMIDE HYDROCHLORIDE 2 MG/1
2 CAPSULE ORAL 2 TIMES DAILY PRN
Status: ON HOLD | COMMUNITY
End: 2022-02-17 | Stop reason: HOSPADM

## 2022-02-16 RX ORDER — SODIUM CHLORIDE, SODIUM LACTATE, POTASSIUM CHLORIDE, AND CALCIUM CHLORIDE .6; .31; .03; .02 G/100ML; G/100ML; G/100ML; G/100ML
1000 INJECTION, SOLUTION INTRAVENOUS ONCE
Status: COMPLETED | OUTPATIENT
Start: 2022-02-16 | End: 2022-02-16

## 2022-02-16 RX ORDER — FUROSEMIDE 20 MG/1
60 TABLET ORAL DAILY
Status: ON HOLD | COMMUNITY
End: 2022-02-17 | Stop reason: HOSPADM

## 2022-02-16 RX ORDER — POTASSIUM CHLORIDE 750 MG/1
10 TABLET, EXTENDED RELEASE ORAL 2 TIMES DAILY
Status: ON HOLD | COMMUNITY
End: 2022-02-17 | Stop reason: HOSPADM

## 2022-02-16 RX ORDER — SODIUM CHLORIDE 9 MG/ML
25 INJECTION, SOLUTION INTRAVENOUS PRN
Status: DISCONTINUED | OUTPATIENT
Start: 2022-02-16 | End: 2022-02-17 | Stop reason: HOSPADM

## 2022-02-16 RX ORDER — ACETAMINOPHEN 325 MG/1
650 TABLET ORAL EVERY 6 HOURS PRN
Status: ON HOLD | COMMUNITY
End: 2022-02-17 | Stop reason: HOSPADM

## 2022-02-16 RX ORDER — SODIUM CHLORIDE 9 MG/ML
INJECTION, SOLUTION INTRAVENOUS CONTINUOUS
Status: DISCONTINUED | OUTPATIENT
Start: 2022-02-16 | End: 2022-02-17 | Stop reason: HOSPADM

## 2022-02-16 RX ORDER — PANTOPRAZOLE SODIUM 40 MG/1
40 TABLET, DELAYED RELEASE ORAL DAILY
Status: ON HOLD | COMMUNITY
End: 2022-02-17 | Stop reason: HOSPADM

## 2022-02-16 RX ORDER — MORPHINE SULFATE 2 MG/ML
2 INJECTION, SOLUTION INTRAMUSCULAR; INTRAVENOUS
Status: DISCONTINUED | OUTPATIENT
Start: 2022-02-16 | End: 2022-02-17 | Stop reason: HOSPADM

## 2022-02-16 RX ORDER — AMIODARONE HYDROCHLORIDE 200 MG/1
200 TABLET ORAL DAILY
Status: ON HOLD | COMMUNITY
End: 2022-02-17 | Stop reason: HOSPADM

## 2022-02-16 RX ORDER — SODIUM CHLORIDE, SODIUM LACTATE, POTASSIUM CHLORIDE, AND CALCIUM CHLORIDE .6; .31; .03; .02 G/100ML; G/100ML; G/100ML; G/100ML
1000 INJECTION, SOLUTION INTRAVENOUS ONCE
Status: DISCONTINUED | OUTPATIENT
Start: 2022-02-16 | End: 2022-02-17 | Stop reason: HOSPADM

## 2022-02-16 RX ORDER — FERROUS SULFATE 325(65) MG
325 TABLET ORAL
Status: ON HOLD | COMMUNITY
End: 2022-02-17 | Stop reason: HOSPADM

## 2022-02-16 RX ORDER — SODIUM CHLORIDE 0.9 % (FLUSH) 0.9 %
5-40 SYRINGE (ML) INJECTION EVERY 12 HOURS SCHEDULED
Status: DISCONTINUED | OUTPATIENT
Start: 2022-02-16 | End: 2022-02-17 | Stop reason: HOSPADM

## 2022-02-16 RX ORDER — SODIUM CHLORIDE 0.9 % (FLUSH) 0.9 %
5-40 SYRINGE (ML) INJECTION PRN
Status: DISCONTINUED | OUTPATIENT
Start: 2022-02-16 | End: 2022-02-17 | Stop reason: HOSPADM

## 2022-02-16 RX ORDER — QUETIAPINE FUMARATE 25 MG/1
25 TABLET, FILM COATED ORAL DAILY
Status: ON HOLD | COMMUNITY
End: 2022-02-17 | Stop reason: HOSPADM

## 2022-02-16 RX ORDER — QUETIAPINE FUMARATE 50 MG/1
50 TABLET, FILM COATED ORAL NIGHTLY
Status: ON HOLD | COMMUNITY
End: 2022-02-17 | Stop reason: HOSPADM

## 2022-02-16 RX ORDER — LORAZEPAM 2 MG/ML
1 INJECTION INTRAMUSCULAR EVERY 4 HOURS PRN
Status: DISCONTINUED | OUTPATIENT
Start: 2022-02-16 | End: 2022-02-17 | Stop reason: HOSPADM

## 2022-02-16 RX ORDER — ACETAMINOPHEN 325 MG/1
650 TABLET ORAL EVERY 4 HOURS PRN
Status: DISCONTINUED | OUTPATIENT
Start: 2022-02-16 | End: 2022-02-17 | Stop reason: HOSPADM

## 2022-02-16 RX ORDER — PROMETHAZINE HYDROCHLORIDE 12.5 MG/1
12.5 SUPPOSITORY RECTAL EVERY 6 HOURS PRN
Status: ON HOLD | COMMUNITY
End: 2022-02-17 | Stop reason: HOSPADM

## 2022-02-16 RX ADMIN — PIPERACILLIN SODIUM AND TAZOBACTAM SODIUM 4500 MG: 4; .5 INJECTION, POWDER, LYOPHILIZED, FOR SOLUTION INTRAVENOUS at 15:48

## 2022-02-16 RX ADMIN — SODIUM CHLORIDE, POTASSIUM CHLORIDE, SODIUM LACTATE AND CALCIUM CHLORIDE 1000 ML: 600; 310; 30; 20 INJECTION, SOLUTION INTRAVENOUS at 12:17

## 2022-02-16 RX ADMIN — FENTANYL CITRATE 25 MCG: 50 INJECTION, SOLUTION INTRAMUSCULAR; INTRAVENOUS at 12:12

## 2022-02-16 RX ADMIN — SODIUM CHLORIDE, SODIUM LACTATE, POTASSIUM CHLORIDE, AND CALCIUM CHLORIDE 1000 ML: .6; .31; .03; .02 INJECTION, SOLUTION INTRAVENOUS at 16:21

## 2022-02-16 RX ADMIN — SODIUM CHLORIDE: 9 INJECTION, SOLUTION INTRAVENOUS at 22:19

## 2022-02-16 RX ADMIN — SODIUM CHLORIDE, PRESERVATIVE FREE 10 ML: 5 INJECTION INTRAVENOUS at 22:17

## 2022-02-16 ASSESSMENT — PAIN SCALES - PAIN ASSESSMENT IN ADVANCED DEMENTIA (PAINAD)
FACIALEXPRESSION: 2
BODYLANGUAGE: 1
TOTALSCORE: 5
BREATHING: 0
NEGVOCALIZATION: 1
CONSOLABILITY: 1

## 2022-02-16 ASSESSMENT — PAIN SCALES - GENERAL: PAINLEVEL_OUTOF10: 2

## 2022-02-16 NOTE — ED NOTES
Spoke with Ana Gaitan RN at [place]. She called to ensure we knew pt was hospice pt. Contact info: [phone].    Ask for Shaneka Caldwell Team.     Brinda Rodriges RN  02/16/22 9124

## 2022-02-16 NOTE — ED PROVIDER NOTES
Attending Note:    The patient was seen and examined by the mid-level provider. I also completed a face-to-face examination. HPI: Salvador Bowman is a 80 y.o. female who presents to the emergency department with a complaint of nausea, vomiting, possible constipation. The patient is a current resident of an extended care facility and was sent here for possible enema due to constipation. She had an abdominal series done at the extended care facility which showed ileus versus small bowel obstruction. The patient has had a decline over the last several days. She is normally more communicative with some history of memory loss but has been mostly nonverbal over the last 4 days. Appetite has been decreased and she has not been eating or drinking. Oral intake has been decreased. Urine output is unknown. There is no report of any melena hematochezia. No report of any fall trauma or injury. She denies any chest pain shortness of breath. She complains of abdominal pain but is unable to describe it. She denies any back or flank pain. There is no report of any focal weakness or numbness. No cough or cold symptoms. History of aspiration. Physical Exam:     Constitutional: Mild to moderate discomfort. Mildly ill-appearing. Generally weak. Head: No visible evidence of trauma. Normocephalic. Eyes: Pupils equal and reactive. No photophobia. Conjunctiva normal.    HENT: Oral mucosa dry. Lips cracked. Airway patent. Neck:  Soft and supple. Nontender. Heart:  Regular rate and rhythm. No murmur. Sinus rhythm on the monitor. Lungs:  Clear to auscultation. No wheezes, rales, or ronchi. No conversational dyspnea or accessory muscle use. Abdomen:  Soft, distended. Diffusely tender to palpation. No guarding rigidity or rebound. Musculoskeletal: Extremities non-tender with full range of motion. Radial and dorsalis pedis pulses were equal laterally.   No calf tenderness erythema or edema. Neurological: Alert and oriented to person only. Some difficulty with communication due to hearing loss. Follows some simple commands. Answers mostly yes and no. Speech clear. No acute focal motor or sensory deficits. Skin: Skin is warm and dry. No rash. Psychiatric: Normal mood and affect. Behavior is normal.     DIAGNOSTIC RESULTS     EKG: All EKG's are interpreted by the Emergency Department Physician who either signs or Co-signs this chart in the absence of a cardiologist.    Normal sinus rhythm. Rate 86. HI interval 144 ms. QRS duration 88 ms. QTc 430 ms. R axis XX 4 degrees. No ST elevation. Nonspecific T wave abnormalities with T wave inversions in the lateral leads. EKG was very similar in comparison to a previous EKG from January 23, 2022. RADIOLOGY:   Non-plain film images such as CT, Ultrasound and MRI are read by the radiologist. Plain radiographic images are visualized and preliminarily interpreted by the emergency physician with the below findings:        Interpretation per the Radiologist below, if available at the time of this note:    CT Head WO Contrast   Final Result   No acute intracranial abnormality. No change from prior study. CT ABDOMEN PELVIS WO CONTRAST Additional Contrast? None   Final Result   Highly suspicious mass involving the ileocecal junction with surrounding   nodularity presumed metastatic localized adenopathy. 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 In addition, this likely accounts for the small-bowel obstruction extending   to the terminal ileum. NG tube for decompression and GI consultation recommended.                ED BEDSIDE ULTRASOUND:   Performed by ED Physician - none    LABS:  Labs Reviewed   BASIC METABOLIC PANEL W/ REFLEX TO MG FOR LOW K - Abnormal; Notable for the following components:       Result Value    Chloride 98 (*)     Anion Gap 17 (*)     Glucose 130 (*)     BUN 92 (*)     CREATININE 2.7 (*)     GFR Non- 16 (*)     GFR  20 (*)     All other components within normal limits    Narrative:     Gavino Negro tel. [phone],  Chemistry results called to and read back by Oxana Lugo RN, 02/16/2022  12:52, by Clare Ibarra  Performed at:  [facility]  [address], [facility] [place] [address]   Phone [phone]   CBC WITH AUTO DIFFERENTIAL - Abnormal; Notable for the following components:    WBC 15.9 (*)     Hemoglobin 11.3 (*)     MCV 70.3 (*)     MCH 21.9 (*)     RDW 21.1 (*)     Platelets 383 (*)     Neutrophils Absolute 13.0 (*)     All other components within normal limits    Narrative:     Performed at:  [place] [address], [place] [address]   Phone [phone]   LIPASE - Abnormal; Notable for the following components:    Lipase 9.0 (*)     All other components within normal limits    Narrative:     [address],  Chemistry results called to and read back by Oxana Lugo RN, 02/16/2022  12:52, by Clare Ibarra  Performed at:  [facility]  [address], [facility] [place] [address]   Phone [phone]   TROPONIN - Abnormal; Notable for the following components:    Troponin 0.02 (*)     All other components within normal limits    Narrative:     Performed at:  [facility]  [address], [facility] [place] [address]   Phone [phone]   URINALYSIS WITH REFLEX TO CULTURE - Abnormal; Notable for the following components:    Clarity, UA CLOUDY (*)     Bilirubin Urine MODERATE (*)     Leukocyte Esterase, Urine SMALL (*)     All other components within normal limits    Narrative:     Performed at:  [place] [address], [facility] [place] [address]   Phone [phone]   HEPATIC FUNCTION PANEL - Abnormal; Notable for the following components:    Albumin 3.1 (*)     Alkaline Phosphatase 229 (*)     All other components within normal limits    Narrative:     Performed at:  [address] [place] [address]   Phone [phone]   LACTATE, SEPSIS - Abnormal; Notable for the following components:    Lactic Acid, Sepsis 2.9 (*)     All other components within normal limits    Narrative:     Performed at:  [address] [facility] [address]   Phone [phone]   MICROSCOPIC URINALYSIS - Abnormal; Notable for the following components:    Coarse Casts, UA 3-5 (*)     RBC, UA 5-10 (*)     Yeast, UA Present (*)     WBC, UA 39 (*)     All other components within normal limits    Narrative:     Performed at:  [address] [place] [address]   Phone [phone]   POCT GLUCOSE - Abnormal; Notable for the following components:    POC Glucose 120 (*)     All other components within normal limits    Narrative:     Performed at:  [address] [place] [address]   Phone [phone]   POCT GLUCOSE - Normal   CULTURE, BLOOD 1   CULTURE, BLOOD 2   CULTURE, URINE   LACTATE, SEPSIS       All other labs were within normal range or not returned as of this dictation. EMERGENCY DEPARTMENT COURSE and DIFFERENTIAL DIAGNOSIS/MDM:   Vitals:    Vitals:    02/16/22 1430 02/16/22 1500 02/16/22 1515 02/16/22 1530   BP: 118/66 (!) 102/41 (!) 94/43 (!) 92/41   Pulse: 85  86 84   Resp: 25  19 17   Temp:       TempSrc:       SpO2: 95%   97%   Weight:           I personally saw the patient and was involved in the medical decision making. The patient presents with reports of altered mental status as well as nausea vomiting possible constipation.   She did have an abdominal series which revealed possible ileus versus small bowel obstruction at the extended care facility. Patient is awake and alert. No acute focal motor or sensory deficits. She is only oriented to person. Neurological exam is limited by hearing loss and difficulty with communication. Initial blood pressure was 118/66. Repeat blood pressure is 92/41 at the time of my exam.  Heart rate is in the 80s. She does have diffuse abdominal discomfort to palpation but no localizing tenderness. Is mildly distended. CT head without contrast is negative. CT abdomen and pelvis was obtained. There is a mass at the ileocecal junction with surrounding nodularity presumed to be metastatic localized adenopathy. Metastatic disease with extension to the liver is noted as well. Distal colon is decompressed. Obstruction is likely secondary to the mass. There is evidence of acute kidney injury with a creatinine of 2.7 and a GFR of 16. BUN is elevated at 92. The patient does appear to be volume depleted. White blood cell count is 15.9. There is evidence of urinary tract infection. She was given antibiotics to cover UTI.    4:10 PM: I had a lengthy discussion with Hernan Osman the patient's daughter and power of . She is currently in [place] and is trying to make arrangements to get home. The patient is DNR comfort care and was recently placed in hospice a day and a half ago. She does not wish that the patient have an NG tube, surgery, or aggressive treatment. She wishes that she be made comfortable. She is okay with IV fluids or antibiotics if needed. I explained to her the complicated and complex nature of her condition including the likelihood of underlying malignancy as the cause for her obstruction. Hospice was contacted to potentially arrange for inpatient hospice care. At the time of change of shift at 4:35 PM, final disposition is pending evaluation by hospice.     [facility]      CRITICAL CARE TIME     I personally saw the patient and independently provided 20 minutes of non-concurrent critical care out of the total shared critical care time provided. This excludes separately reportable procedures. Critical care time was in addition to that provided by the nurse practitioner. There was a high probability of clinically significant/life threatening deterioration in the patient's condition which required my urgent intervention. CONSULTS:  IP CONSULT TO GI  IP CONSULT TO PALLIATIVE CARE  IP CONSULT TO HOSPICE    PROCEDURES:  Unless otherwise noted below, none     Procedures        FINAL IMPRESSION      1. Small bowel obstruction ([address]    2. Liver mass    3. Colonic mass    4. Urinary tract infection without hematuria, site unspecified    5. Dehydration    6. Acute kidney injury [facility])          DISPOSITION/PLAN   DISPOSITION Decision To Admit 02/16/2022 02:26:10 PM      PATIENT REFERRED TO:  No follow-up provider specified. DISCHARGE MEDICATIONS:  New Prescriptions    No medications on file     Controlled Substances Monitoring:     No flowsheet data found. (Please note that portions of this note were completed with a voice recognition program.  Efforts were made to edit the dictations but occasionally words are mis-transcribed. )    [id number] Sath[address] eLe DO (electronically signed)  Attending Emergency Physician      Yecenia Sevilla DO  02/16/22 1294

## 2022-02-16 NOTE — ED PROVIDER NOTES
[address]        Pt Name: Adan Lang  MRN: [id number]  [place] 10/20/1929  Date of evaluation: 2/16/2022  Provider: TIAN Maya - CNP  PCP: Ramin Nicolas 90  Note Started: 11:40 AM EST       I have seen and evaluated this patient with my supervising physician Aparna Villanueva DO. Triage CHIEF COMPLAINT       Chief Complaint   Patient presents with    Constipation     dx with SBO. last BM 4 days ago. decreased PO intake. Admitted to Hospice yesterday. pt comes from [place] by EMS.  Emesis         HISTORY OF PRESENT ILLNESS   (Location/Symptom, Timing/Onset, Context/Setting, Quality, Duration, Modifying Factors, Severity)  Note limiting factors. Chief Complaint: Ileus    Adan Lang is a 80 y.o. female who presents via EMS from her nursing home. Patient is nonverbal and history was obtained by daughter Shira Garcia via telephone [phone] and son-in-law who was at bedside. Per them, patient has had 4 days of being almost completely nonverbal and 4 days of no bowel movements. She has also been vomiting. As a result she had an x-ray performed at her ECF which showed an ileus. Per family patient is a hospice patient as of 1.5 days ago, where they were told she was being made a hospice patient to get \"extra help \". Did have a discussion with daughter on phone who says she does not want mother on ventilator and thought all of this could be managed with just a enema. The patient will open her eyes on command and will squeeze my hands on command. She does not answer orientation questions. She has a history of dementia and is confused at baseline per son-in-law. He does say she was verbal and talkative as of 3 to 4 days ago. Nursing Notes were all reviewed and agreed with or any disagreements were addressed in the HPI.     REVIEW OF SYSTEMS    (2-9 systems for level 4, 10 or more for level 5)     Review of Systems   Unable to perform ROS: Patient nonverbal   Patient grimaces when palpating abdomen    PAST MEDICAL HISTORY   No past medical history on file. SURGICAL HISTORY   No past surgical history on file. [address]       Current Discharge Medication List      CONTINUE these medications which have NOT CHANGED    Details   amiodarone (CORDARONE) 200 MG tablet Take 200 mg by mouth daily      ferrous sulfate (IRON 325) 325 (65 Fe) MG tablet Take 325 mg by mouth daily (with breakfast)      furosemide (LASIX) 20 MG tablet Take 60 mg by mouth daily      loperamide (IMODIUM) 2 MG capsule Take 2 mg by mouth 2 times daily as needed for Diarrhea      Multiple Vitamins-Minerals (THERAPEUTIC MULTIVITAMIN-MINERALS) tablet Take 1 tablet by mouth daily      pantoprazole (PROTONIX) 40 MG tablet Take 40 mg by mouth daily      promethazine (PHENERGAN) 12.5 MG suppository Place 12.5 mg rectally every 6 hours as needed for Nausea      potassium chloride (KLOR-CON M) 10 MEQ extended release tablet Take 10 mEq by mouth 2 times daily      !! QUEtiapine (SEROQUEL) 25 MG tablet Take 25 mg by mouth daily      !! QUEtiapine (SEROQUEL) 50 MG tablet Take 50 mg by mouth at bedtime      acetaminophen (TYLENOL) 325 MG tablet Take 650 mg by mouth every 6 hours as needed for Pain      vitamin D 25 MCG (1000 UT) CAPS Take 1,000 Units by mouth daily       ! ! - Potential duplicate medications found. Please discuss with provider. ALLERGIES     Fenoprofen    FAMILYHISTORY     No family history on file. SOCIAL HISTORY       Social History     Socioeconomic History    Marital status:       Spouse name: Not on file    Number of children: Not on file    Years of education: Not on file    Highest education level: Not on file   Occupational History    Not on file   Tobacco Use    Smoking status: Not on file    Smokeless tobacco: Not on file   Substance and Sexual Activity    Alcohol use: Not on file    Drug use: Not on file    Sexual activity: Not on file   Other Topics Concern    Not on file   Social History Narrative    Not on file     Social Determinants of Health     Financial Resource Strain:     Difficulty of Paying Living Expenses: Not on file   Food Insecurity:     Worried About [address] in the Last Year: Not on file    Chandrika of Food in the Last Year: Not on file   Transportation Needs:     Lack of Transportation (Medical): Not on file    Lack of Transportation (Non-Medical): Not on file   Physical Activity:     Days of Exercise per Week: Not on file    Minutes of Exercise per Session: Not on file   Stress:     Feeling of Stress : Not on file   Social Connections:     Frequency of Communication with Friends and Family: Not on file    Frequency of Social Gatherings with Friends and Family: Not on file    Attends [employer] Services: Not on file    Active Member of [address] [place] [address] or Organizations: Not on file    Attends Club or Organization Meetings: Not on file    Marital Status: Not on file   Intimate Partner Violence:     Fear of Current or Ex-Partner: Not on file    Emotionally Abused: Not on file    Physically Abused: Not on file    Sexually Abused: Not on file   Housing Stability:     Unable to Pay for Housing in the Last Year: Not on file    Number of [place] in the Last Year: Not on file    Unstable Housing in the Last Year: Not on file       SCREENINGS             PHYSICAL EXAM    (up to 7 for level 4, 8 or more for level 5)     ED Triage Vitals   BP Temp Temp Source Pulse Resp SpO2 Height Weight   02/16/22 1041 02/16/22 1041 02/16/22 1041 02/16/22 1041 02/16/22 1041 02/16/22 1041 -- 02/16/22 1040   (!) 95/42 97.5 °F (36.4 °C) Oral 81 16 94 %  158 lb 1.1 oz (71.7 kg)       Physical Exam  Vitals and nursing note reviewed. Constitutional:       Appearance: Normal appearance. She is normal weight. She is ill-appearing. She is not diaphoretic. HENT:      Head: Normocephalic and atraumatic.       Right Ear: External ear normal.      Left Ear: External ear normal.      Nose: Nose normal.      Mouth/Throat:      Mouth: Mucous membranes are dry. Eyes:      General: No scleral icterus. Right eye: No discharge. Left eye: No discharge. Conjunctiva/sclera: Conjunctivae normal.      Pupils: Pupils are equal, round, and reactive to light. Cardiovascular:      Rate and Rhythm: Normal rate and regular rhythm. Pulses: Normal pulses. Heart sounds: Normal heart sounds. No murmur heard. No friction rub. No gallop. Pulmonary:      Effort: Pulmonary effort is normal. No respiratory distress. Breath sounds: Normal breath sounds. No stridor. No wheezing, rhonchi or rales. Abdominal:      General: There is distension. Tenderness: There is abdominal tenderness. There is no guarding. Comments: Grimacing on palpation right lower quadrant   Musculoskeletal:         General: No deformity. Cervical back: Normal range of motion and neck supple. No rigidity or tenderness. Right lower leg: Edema present. Left lower leg: Edema present. Skin:     General: Skin is warm and dry. Capillary Refill: Capillary refill takes less than 2 seconds. Coloration: Skin is pale. Neurological:      Mental Status: She is alert. Mental status is at baseline. She is disoriented. GCS: GCS eye subscore is 2. GCS verbal subscore is 4. GCS motor subscore is 6. Motor: Weakness present. No seizure activity. Psychiatric:         Speech: She is noncommunicative. DIAGNOSTIC RESULTS   LABS:    Labs Reviewed   BASIC METABOLIC PANEL W/ REFLEX TO MG FOR LOW K - Abnormal; Notable for the following components:       Result Value    Chloride 98 (*)     Anion Gap 17 (*)     Glucose 130 (*)     BUN 92 (*)     CREATININE 2.7 (*)     GFR Non- 16 (*)     GFR  20 (*)     All other components within normal limits    Narrative:     CALL  [facility] tel. [phone],  Chemistry results called to and read back by Nadir Acevedo RN, 02/16/2022  12:52, by Javy Vizcaino  Performed at:  [address] [place]   Phone [phone]   CBC WITH AUTO DIFFERENTIAL - Abnormal; Notable for the following components:    WBC 15.9 (*)     Hemoglobin 11.3 (*)     MCV 70.3 (*)     MCH 21.9 (*)     RDW 21.1 (*)     Platelets 604 (*)     Neutrophils Absolute 13.0 (*)     All other components within normal limits    Narrative:     Performed at:  [address] [address]   Phone [phone]   LIPASE - Abnormal; Notable for the following components:    Lipase 9.0 (*)     All other components within normal limits    Narrative:     Ivan Hernandez tel. [phone],  Chemistry results called to and read back by Nadir Acevedo RN, 02/16/2022  12:52, by Javy Vizcaino  Performed at:  [address] [address]   Phone [phone]   TROPONIN - Abnormal; Notable for the following components:    Troponin 0.02 (*)     All other components within normal limits    Narrative:     Performed at:  [address] [address]   Phone [phone]   URINALYSIS WITH REFLEX TO CULTURE - Abnormal; Notable for the following components:    Clarity, UA CLOUDY (*)     Bilirubin Urine MODERATE (*)     Leukocyte Esterase, Urine SMALL (*)     All other components within normal limits    Narrative:     Performed at:  [address] [place]   Phone [phone]   HEPATIC FUNCTION PANEL - Abnormal; Notable for the following components:    Albumin 3.1 (*)     Alkaline Phosphatase 229 (*)     All other components within normal limits    Narrative:     Performed at:  [facility]  [address] [place] [address] [facility] [place] [address]   Phone [phone]   LACTATE, SEPSIS - Abnormal; Notable for the following components:    Lactic Acid, Sepsis 2.9 (*)     All other components within normal limits    Narrative:     Performed at:  [facility]  [address] [facility] [place] [address]   Phone [phone]   MICROSCOPIC URINALYSIS - Abnormal; Notable for the following components:    Coarse Casts, UA 3-5 (*)     RBC, UA 5-10 (*)     Yeast, UA Present (*)     WBC, UA 39 (*)     All other components within normal limits    Narrative:     Performed at:  [address] [address]   Phone [phone]   POCT GLUCOSE - Abnormal; Notable for the following components:    POC Glucose 120 (*)     All other components within normal limits    Narrative:     Performed at:  [address] [place] [address]   Phone [phone]   POCT GLUCOSE - Normal   CULTURE, BLOOD 1   CULTURE, BLOOD 2   CULTURE, URINE       When ordered, only abnormal lab results are displayed. All other labs were within normal range or not returned as of this dictation. EKG: When ordered, EKG's are interpreted by the Emergency Department Physician in the absence of a cardiologist.  Please see their note for interpretation of EKG. RADIOLOGY:   Non-plain film images such as CT, Ultrasound and MRI are read by the radiologist. Yamile Cheek radiographic images are visualized andpreliminarily interpreted by the  ED Provider with the below findings:        Interpretation perthe Radiologist below, if available at the time of this note:    CT Head WO Contrast   Final Result   No acute intracranial abnormality. No change from prior study.          CT ABDOMEN PELVIS WO CONTRAST Additional Contrast? None   Final Result   Highly suspicious mass involving the ileocecal junction with surrounding   nodularity presumed metastatic localized adenopathy. 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 In addition, this likely accounts for the small-bowel obstruction extending   to the terminal ileum. NG tube for decompression and GI consultation recommended. No results found. PROCEDURES   Unless otherwise noted below, none     Procedures    CRITICAL CARE TIME     CRITICAL CARE TIME   Total Critical Care time was 40 minutes, excluding separately reportable procedures. There was a high probability of clinically significant/life threatening deterioration in the patient's condition which required my urgent intervention. This time was spent reviewing the chart, talking to patient's family member several times patient is unable to provide any meaningful information.   Time was also spent talking to hospice, reassessing patient pain, reevaluating patient, chart review, discussing case with hospitalist.      CONSULTS:  IP CONSULT TO GI  IP CONSULT TO PALLIATIVE CARE  IP CONSULT TO HOSPICE  IP CONSULT TO PALLIATIVE CARE  IP CONSULT TO [address] [place] [address] and DIFFERENTIAL DIAGNOSIS/MDM:   Vitals:    Vitals:    02/16/22 1815 02/16/22 1830 02/16/22 1845 02/16/22 2004   BP: (!) 102/43 (!) 111/47 (!) 111/50 119/70   Pulse: 84 85 86 85   Resp:    21   Temp:    97.6 °F (36.4 °C)   TempSrc:    Oral   SpO2:   97% 93%   Weight:           Patient was given thefollowing medications:  Medications   lactated ringers bolus (0 mLs IntraVENous Stopped 2/16/22 1843)   sodium chloride flush 0.9 % injection 5-40 mL (has no administration in time range)   sodium chloride flush 0.9 % injection 5-40 mL (has no administration in time range)   0.9 % sodium chloride infusion (has no administration in time range)   acetaminophen (TYLENOL) tablet 650 mg (has no administration in time range)   morphine (PF) injection 2 mg (has no administration in time range)   LORazepam (ATIVAN) injection 1 mg (has no administration in time range)   0.9 % sodium chloride infusion (has no administration in time range)   lactated ringers bolus (0 mLs IntraVENous Stopped 2/16/22 1619)   fentaNYL (SUBLIMAZE) injection 25 mcg (25 mcg IntraVENous Given 2/16/22 1212)   piperacillin-tazobactam (ZOSYN) 4,500 mg in dextrose 5 % 100 mL IVPB (mini-bag) (0 mg IntraVENous Stopped 2/16/22 1619)     ED Course as of 02/16/22 2007 Wed Feb 16, 2022   1556 With Thierry Lopez at [facility], Thierry Lopez talking to daughter discussing goals of care [KM]      ED Course User Index  [KM] Diane Yeager, TIAN - BROOKE        Ill-appearing female presenting from [facility] with 4 days of worsening symptoms. Please see presentation HPI. Differential diagnosis: Abdominal Aortic Aneurysm, Ischemic Bowel, Bowel Obstruction, Appendicitis, Diverticulitis, Pyelonephritis, UTI,Ureterolithiasis, Colitis,  other  PE with abdominal pain as above. Labs ordered in the ER which show acute on chronic kidney injury with a BUN of 92 creatinine elevated to 2.7 hyperglycemia 130 GFR now 16 from baseline 46. CBC with leukocytosis 15.9, hemoglobin 11.3 platelets 912. Lipase 9.0. Troponin 0 0.02. Hepatic panel with alk phos elevated 229. Patient was given a fluid bolus and some fentanyl for pain. She was assessed after this and pain appears to be improved. CT scans ordered of the head and abdomen. CT head with no change from prior study. CT of the abdomen and pelvis reveals a suspicious mass in the ileocecal junction with nodularity concerning for metastasis with liver involvement and likely causing the small bowel obstruction. Radiology recommends GI consult and NG tube decompression. At time of admission GI callback still pending, will defer to hospitalist even family does not want any heroic measures and is concerned by pain from NG tube. I did contact the daughter Shine Nicole who is her POA at [phone].   Shine Nicole says she makes medical decisions for Gagan May given her Alzheimer's disease. But he tells me Gagan May is in hospice care for the last few days through hospice of [facility]. She tells me that the CODE STATUS is DNR CC. She is agreeable to IV fluids, antibiotics but does not want any heroic measures performed. She is concerned about pain caused by the NG tube and does not want that performed at this time. She is not think about this and discussed with hospitalist if she deems it necessary. Martha Carter is going to be driving up from [place]. I also talked to Brown graham from hospice of [facility] who also talked to Martha Carter and confirmed this plan  Shared decision was made with Corie Jovel to not perform any heroic measures and to admit patient for IV fluids and antibiotics. Did talk to hospitalist Dr. Radha Blackmon who is agreeable to accept the patient. Patient to be admitted to their service for further evaluation and management    SEP-1 CORE MEASURE DATA      Sepsis Criteria   Severe Sepsis Criteria   Septic Shock Criteria     Must be confirmed or suspected to move forward with diagnosis of sepsis. Must meet 2:    [] Temperature > 100.9 F (38.3 C)        or < 96.8 F (36 C)  [x] HR > 90  [] RR > 20  [x] WBC > 12 or < 4 or 10% bands    AND:    [x] Infection Confirmed or        Suspected.     OR:    [] Exclude from SEP-1 because:    [] No infection present or suspected  [] Does not have 2+ SIRS criteria but may have an incidental infection that requires treatment  [] May have sepsis, but does not meet criteria for severe sepsis or septic shock  [] Alternative explanation for abnormal labs and/or vitals (see MDM)  [] Viral etiology found or highly suspected (including COVID-19) without concomitant bacterial infection   Must meet 1:    [x] Lactate > 2       or   [x] Signs of Organ Dysfunction:    - SBP < 90 or MAP < 65  - Altered mental status  - Creatinine > 2 or increased from      baseline  - Urine Output < 0.5 ml/kg/hr  - Bilirubin > 2  - INR > 1.5 (not anticoagulated)  - Platelets < 758,779  - Acute Respiratory Failure as     evidenced by new need for NIPPV     or mechanical ventilation        [] No criteria met for Severe Sepsis. Must meet 1:    [] Lactate > 4        or   [] SBP < 90 or MAP < 65 for at        least two readings in the first        hour after fluid bolus        administration      [] Vasopressors initiated (if hypotension persists after fluid resuscitation)                [] No criteria met for Septic Shock. Patient Vitals for the past 6 hrs:   BP Temp Pulse Resp SpO2   02/16/22 1415 (!) 101/59 -- 82 20 94 %   02/16/22 1430 118/66 -- 85 25 95 %   02/16/22 1500 (!) 102/41 -- -- -- --   02/16/22 1515 (!) 94/43 -- 86 19 --   02/16/22 1530 (!) 92/41 -- 84 17 97 %   02/16/22 1615 (!) 99/54 -- 81 20 93 %   02/16/22 1630 (!) 64/52 -- 87 16 95 %   02/16/22 1645 (!) 99/45 -- 83 21 94 %   02/16/22 1700 (!) 109/43 -- 81 20 94 %   02/16/22 1715 (!) 76/47 -- 83 18 94 %   02/16/22 1730 (!) 99/40 -- 83 18 91 %   02/16/22 1745 87/62 -- 86 -- 94 %   02/16/22 1800 (!) 106/44 -- 90 -- 97 %   02/16/22 1815 (!) 102/43 -- 84 -- --   02/16/22 1830 (!) 111/47 -- 85 -- --   02/16/22 1845 (!) 111/50 -- 86 -- 97 %   02/16/22 2004 119/70 97.6 °F (36.4 °C) 85 21 93 %      Recent Labs     02/16/22  1207   WBC 15.9*   CREATININE 2.7*   BILITOT 0.5   *         Sepsis Time Identified: 1340    Fluid Resuscitation Rational: at least 30mL/kg based on entered actual weight at time of triage    Infection Source: Abdominal    Repeat lactate level: pending    Reassessment Exam:   pending, to be completed by inpatient team       FINAL IMPRESSION      1. Small bowel obstruction ([address]    2. Liver mass    3. Colonic mass    4. Urinary tract infection without hematuria, site unspecified    5. Dehydration    6. Acute kidney injury ([facility] [address]          DISPOSITION/PLAN   DISPOSITION        PATIENT REFERREDTO:  No follow-up provider specified.     DISCHARGE MEDICATIONS:  Current Discharge Medication List          DISCONTINUED MEDICATIONS:  Current Discharge Medication List                 (Please note that portions ofthis note were completed with a voice recognition program.  Efforts were made to edit the dictations but occasionally words are mis-transcribed.)    TIAN Bañuelos CNP (electronically signed)             TIAN Bañuelos CNP  02/16/22 2007

## 2022-02-16 NOTE — ED NOTES
Pharmacy Medication Reconciliation Note     List of medications patient is currently taking is complete. Source of information:   1. ECF med list  2. nurse    Notes regarding home medications:   1.  Did not receive any medication today    Denies taking any other OTC or herbal medications    Amilcar Abreu PharmD, BCPS  2/16/2022  5:44 PM

## 2022-02-16 NOTE — ED NOTES
Son in [place]  (family in town and was at bed side)  Tre Zacarias [phone]     Burke Conner RN  02/16/22 9016

## 2022-02-16 NOTE — ED NOTES
Bed: [facility]  Expected date:   Expected time:   Means of arrival:   Comments:  89F bowel obstruction     Anselmo Bird RN  02/16/22 1038

## 2022-02-16 NOTE — ED NOTES
TRANSFER - OUT REPORT:    Verbal report given to Rajesh Hernández RN on Ritika Mejia  being transferred to [facility] for routine progression of patient care       Report consisted of patient's Situation, Background, Assessment and   Recommendations(SBAR). Information from the following report(s) Nurse Handoff Report was reviewed with the receiving nurse. Lines:   Peripheral IV 02/16/22 Right Hand (Active)   Site Assessment Clean;Dry; Intact 02/16/22 1212   Line Status Brisk blood return;Normal saline locked; Flushed 02/16/22 1212   Dressing Status Clean;Dry; Intact 02/16/22 1212        Opportunity for questions and clarification was provided.       Patient transported with:  Monitor       Yartiza Vora RN  02/16/22 5140

## 2022-02-16 NOTE — CARE COORDINATION
Patient came from [facility] prior to arrival.  Call to Sha Katiana Stacey [place] [address] at [phone] who confirmed the patient is:  [x] [address], no Precert required for return.  [] [address] will be required for return. [] Skilled Nursing Care, no Precert required for return. [] [address] required for return. [x] Is fully vaccinated for Covid. [] Has started Covid vaccination, but is not complete. [] Is not vaccinated for Covid. [] No Covid Test needed for return. [x] Rapid Covid test needed before return within: [] 48 hours, [x] 72 hours, [] 5 days, [] other  [] PCR Covid test needed before return.    [] Confirmed with the patient or family that the plan is to return to this facility at D/C. [] Patient and/or designated decision maker does not plan for the patient to return to this facility at D/C.      Electronically signed by ATTILA Peralta on 2/16/2022 at 4:04 PM

## 2022-02-16 NOTE — ED NOTES
Pt is in Hospice as of yesterday for severe dementia. Pt's son-in-law present at bedside. Pt';s daughter is POA but she is in [place]. Pt had an xray done yesterday with findings of ileus. Pt has had decreased PO intake and no BM in 4 days, which per family, this is unusual because pt usually has good appetite. DNR-CC. Copy at bedside.      Carlos Saab RN  02/16/22 [address] [facility], RN  02/16/22 7814

## 2022-02-17 VITALS
WEIGHT: 159.17 LBS | HEART RATE: 72 BPM | DIASTOLIC BLOOD PRESSURE: 60 MMHG | RESPIRATION RATE: 18 BRPM | OXYGEN SATURATION: 95 % | SYSTOLIC BLOOD PRESSURE: 114 MMHG | TEMPERATURE: 97.4 F

## 2022-02-17 LAB
EKG ATRIAL RATE: 86 BPM
EKG DIAGNOSIS: NORMAL
EKG P AXIS: 71 DEGREES
EKG P-R INTERVAL: 154 MS
EKG Q-T INTERVAL: 360 MS
EKG QRS DURATION: 88 MS
EKG QTC CALCULATION (BAZETT): 430 MS
EKG R AXIS: 24 DEGREES
EKG T AXIS: 180 DEGREES
EKG VENTRICULAR RATE: 86 BPM
SARS-COV-2, NAAT: NOT DETECTED
URINE CULTURE, ROUTINE: NORMAL

## 2022-02-17 PROCEDURE — 93010 ELECTROCARDIOGRAM REPORT: CPT | Performed by: INTERNAL MEDICINE

## 2022-02-17 PROCEDURE — G0378 HOSPITAL OBSERVATION PER HR: HCPCS

## 2022-02-17 PROCEDURE — 2580000003 HC RX 258: Performed by: INTERNAL MEDICINE

## 2022-02-17 PROCEDURE — 87635 SARS-COV-2 COVID-19 AMP PRB: CPT

## 2022-02-17 PROCEDURE — 96361 HYDRATE IV INFUSION ADD-ON: CPT

## 2022-02-17 PROCEDURE — 96375 TX/PRO/DX INJ NEW DRUG ADDON: CPT

## 2022-02-17 PROCEDURE — 6360000002 HC RX W HCPCS: Performed by: INTERNAL MEDICINE

## 2022-02-17 PROCEDURE — 6370000000 HC RX 637 (ALT 250 FOR IP): Performed by: INTERNAL MEDICINE

## 2022-02-17 PROCEDURE — 96376 TX/PRO/DX INJ SAME DRUG ADON: CPT

## 2022-02-17 RX ORDER — LORAZEPAM 2 MG/ML
1 CONCENTRATE ORAL EVERY 4 HOURS PRN
Qty: 30 ML | Refills: 0 | Status: SHIPPED | OUTPATIENT
Start: 2022-02-17 | End: 2022-02-20

## 2022-02-17 RX ORDER — MORPHINE SULFATE 100 MG/5ML
10 SOLUTION ORAL
Qty: 30 ML | Refills: 0 | Status: SHIPPED | OUTPATIENT
Start: 2022-02-17 | End: 2022-02-20

## 2022-02-17 RX ADMIN — SODIUM CHLORIDE, PRESERVATIVE FREE 10 ML: 5 INJECTION INTRAVENOUS at 08:48

## 2022-02-17 RX ADMIN — MORPHINE SULFATE 2 MG: 2 INJECTION, SOLUTION INTRAMUSCULAR; INTRAVENOUS at 12:02

## 2022-02-17 RX ADMIN — ACETAMINOPHEN 650 MG: 325 TABLET ORAL at 11:11

## 2022-02-17 RX ADMIN — MORPHINE SULFATE 2 MG: 2 INJECTION, SOLUTION INTRAMUSCULAR; INTRAVENOUS at 04:37

## 2022-02-17 ASSESSMENT — PAIN DESCRIPTION - ONSET
ONSET: GRADUAL
ONSET: SUDDEN
ONSET: GRADUAL
ONSET: UNABLE TO TELL
ONSET: UNABLE TO TELL

## 2022-02-17 ASSESSMENT — PAIN DESCRIPTION - FREQUENCY
FREQUENCY: INTERMITTENT

## 2022-02-17 ASSESSMENT — PAIN SCALES - PAIN ASSESSMENT IN ADVANCED DEMENTIA (PAINAD)
TOTALSCORE: 2
BODYLANGUAGE: 1
NEGVOCALIZATION: 0
TOTALSCORE: 0
FACIALEXPRESSION: 1
BODYLANGUAGE: 0
CONSOLABILITY: 2
BREATHING: 0
BREATHING: 0
BODYLANGUAGE: 1
NEGVOCALIZATION: 1
NEGVOCALIZATION: 0
BODYLANGUAGE: 1
FACIALEXPRESSION: 0
TOTALSCORE: 5
CONSOLABILITY: 2
BREATHING: 0
FACIALEXPRESSION: 2
NEGVOCALIZATION: 2
FACIALEXPRESSION: 1
BREATHING: 0
CONSOLABILITY: 0
TOTALSCORE: 7
CONSOLABILITY: 0

## 2022-02-17 ASSESSMENT — PAIN SCALES - GENERAL
PAINLEVEL_OUTOF10: 0
PAINLEVEL_OUTOF10: 2
PAINLEVEL_OUTOF10: 1
PAINLEVEL_OUTOF10: 2
PAINLEVEL_OUTOF10: 5
PAINLEVEL_OUTOF10: 2
PAINLEVEL_OUTOF10: 7

## 2022-02-17 ASSESSMENT — PAIN DESCRIPTION - ORIENTATION
ORIENTATION: OTHER (COMMENT)

## 2022-02-17 ASSESSMENT — PAIN DESCRIPTION - PAIN TYPE
TYPE: ACUTE PAIN

## 2022-02-17 ASSESSMENT — PAIN DESCRIPTION - PROGRESSION
CLINICAL_PROGRESSION: NOT CHANGED
CLINICAL_PROGRESSION: GRADUALLY WORSENING
CLINICAL_PROGRESSION: GRADUALLY IMPROVING

## 2022-02-17 ASSESSMENT — PAIN DESCRIPTION - LOCATION
LOCATION: BACK
LOCATION: BACK
LOCATION: BACK;HIP
LOCATION: BACK;HIP
LOCATION: BACK

## 2022-02-17 ASSESSMENT — PAIN - FUNCTIONAL ASSESSMENT
PAIN_FUNCTIONAL_ASSESSMENT: PREVENTS OR INTERFERES SOME ACTIVE ACTIVITIES AND ADLS

## 2022-02-17 ASSESSMENT — PAIN DESCRIPTION - DESCRIPTORS
DESCRIPTORS: PATIENT UNABLE TO DESCRIBE

## 2022-02-17 NOTE — DISCHARGE INSTR - COC
Continuity of Care Form    Patient Name: Ritika Mejia   :  10/20/1929  MRN:  [id number]    Admit date:  2022  Discharge date:  2022    Code Status Order: DNR-CC   Advance Directives:      Admitting Physician:  Yuly Balderrama MD  PCP: Walter Bird    Discharging Nurse: Priya Huang Unit/Room#: Z9I-7934/5576-50  Discharging Unit Phone Number: [phone]    Emergency Contact:   Extended Emergency Contact Information  Primary Emergency Contact: Leana Cole  Address: [address]           [facility] [place], [address] Phone: [phone]  Mobile Phone: [phone]  Relation: Child  Secondary Emergency Contact: Jillian Lopez  [place] Phone: [phone]  Relation: Child    Past Surgical History:  No past surgical history on file. Immunization History: There is no immunization history on file for this patient. Active Problems: There is no problem list on file for this patient.       Isolation/Infection:   Isolation            Contact          Patient Infection Status       Infection Onset Added Last Indicated Last Indicated By Review Planned Expiration Resolved Resolved By    ESBL (Extended Spectrum Beta Lactamase) 22 Culture, Urine        Resolved    COVID-19 (Rule Out) 22 COVID-19, Rapid (Ordered)   22 Rule-Out Test Resulted            Nurse Assessment:  Last Vital Signs: /60   Pulse 72   Temp 97.4 °F (36.3 °C) (Oral)   Resp 18   Wt 159 lb 2.8 oz (72.2 kg)   SpO2 95%     Last documented pain score (0-10 scale): Pain Level: 0  Last Weight:   Wt Readings from Last 1 Encounters:   22 159 lb 2.8 oz (72.2 kg)     Mental Status:  disoriented and alert    IV Access:  - None    Nursing Mobility/ADLs:  Walking   Dependent  Transfer  Dependent  Bathing  Dependent  Dressing  Dependent  Toileting  Dependent  Feeding  Dependent  Med Admin  Dependent  Med Delivery   crushed    Wound Care Documentation and Therapy:  Wound 22 Buttocks Left (Active)   Wound Etiology Pressure Stage  2 22 0850   Dressing Status Other (Comment) 22 0850   Wound Cleansed Other (Comment) 22 0850   Dressing/Treatment Zinc paste 22 0850   Wound Assessment [place]/red;Superficial 22 0850   Drainage Amount None 22 0850   Odor None 22 0850   Kelly-wound Assessment Intact; Blanchable erythema 22 0850   Number of days: 0        Elimination:  Continence: Bowel: No  Bladder: No  Urinary Catheter: None   Colostomy/Ileostomy/Ileal Conduit: No       Date of Last BM: 2022    Intake/Output Summary (Last 24 hours) at 2022 1110  Last data filed at 2022 0853  Gross per 24 hour   Intake 1100 ml   Output --   Net 1100 ml     I/O last 3 completed shifts: In: 1100 [IV Piggyback:1100]  Out: -     Safety Concerns:      At Risk for Falls    Impairments/Disabilities:      None    Nutrition Therapy:  Current Nutrition Therapy:   - Oral Diet:  NPO    Routes of Feeding: Oral  Liquids: Honey Thick Liquids  Daily Fluid Restriction: no  Last Modified Barium Swallow with Video (Video Swallowing Test): not done    Treatments at the Time of Hospital Discharge:   Respiratory Treatments: ***  Oxygen Therapy:  {Therapy; copd oxygen:43372}  Ventilator:    { CC Vent KZL}    Rehab Therapies: {THERAPEUTIC INTERVENTION:2228284038}  Weight Bearing Status/Restrictions: {[facility] Weight Bearin}  Other Medical Equipment (for information only, NOT a DME order):  {EQUIPMENT:460944852}  Other Treatments: ***    Patient's personal belongings (please select all that are sent with patient):  None    RN SIGNATURE:  Electronically signed by Donovan Holland RN on 22 at 4:54 PM EST    CASE MANAGEMENT/SOCIAL WORK SECTION    Inpatient Status Date: ***    Readmission Risk Assessment Score:  Readmission Risk              Risk of Unplanned Readmission:  0         Discharging to Facility/ Agency   Name: [address] and Rehabilitation  [facility] [address]   Phone:  [phone]  Fax:  [phone]    Dialysis Facility (if applicable)   Name:  Address:  Dialysis Schedule:  Phone:  Fax:    / signature: Electronically signed by Yonatan Yepez on 2/17/22 at 2:32 PM EST    PHYSICIAN SECTION    Prognosis: Poor    Condition at Discharge: Terminal    Rehab Potential (if transferring to Rehab): Poor    Recommended Labs or Other Treatments After Discharge:   -Morphine as needed every 2 hours for pain. (To be administered sublingually)   -Ativan as needed for anxiety every 4 hours (sublingual)     Physician Certification: I certify the above information and transfer of Dane Console  is necessary for the continuing treatment of the diagnosis listed and that she requires Hospice at the nursing facility for less 30 days.      Update Admission H&P: No change in H&P    PHYSICIAN SIGNATURE:  Electronically signed by Kieran Ocasio MD on 2/17/22 at 2:38 PM EST

## 2022-02-17 NOTE — ED NOTES
Suspect pt is sundowning and becoming restless. Placed on fall precautions.       Sean Barragan RN  02/16/22 4026

## 2022-02-17 NOTE — PLAN OF CARE
Problem: Skin Integrity:  Goal: Will show no infection signs and symptoms  Description: Will show no infection signs and symptoms  2/17/2022 1045 by Edward Randolph RN  Outcome: Ongoing   Pt shows no signs of infection. Will monitor VS.   Problem: Skin Integrity:  Goal: Absence of new skin breakdown  Description: Absence of new skin breakdown  2/17/2022 1045 by Edward Randolph RN  Outcome: Ongoing   Pt shows no signs of new skin breakdown. Will monitor. Problem: Falls - Risk of:  Goal: Will remain free from falls  Description: Will remain free from falls  2/17/2022 1045 by Edward Randolph RN  Outcome: Ongoing   Fall risk assessment completed. Fall precautions in place. Call light within reach. Pt educated on calling for assistance before getting up. Walkway free of clutter. Will continue to monitor. Problem: Pain:  Goal: Pain level will decrease  Description: Pain level will decrease  2/17/2022 1045 by Edward Randolph RN  Outcome: Ongoing   Pt assessed for pain. Pt in pain and assessed with 0-10 pain rating scale. Pt given prescribed analgesic for pain. (See eMar) Pt satisfied with pain relief thus far. Will reassess and continue to monitor.

## 2022-02-17 NOTE — CARE COORDINATION
DISCHARGE SUMMARY     DATE OF DISCHARGE: 2/17/2022    DISCHARGE DESTINATION: Return to [facility] with [address] ([address]) services. TRANSPORTATION: [address] at 815pm    Phone Number: [phone]    COMMENTS: Attending MD will complete RASHI and scripts for patient's pain med and lorazepam. [facility]. Informed Sandra at [facility]. Patient will go to a private room--room 325--and daughter may visit at anytime. Patient will need covid test.   Rn aware. Report number [phone].     Electronically signed by SAVANNAH Chong, YUNI, Case Management on 2/17/2022 at 2:41 PM  [address] [id number]

## 2022-02-17 NOTE — ED NOTES
Fall risk screening completed. Fall risk bracelet applied to patient. Non-skid socks provided and placed on patient. The fall risk is indicated using  dome light . Based on score, a bed alarm was indicated and applied. The call light is within the patient's reach, and instructions for use were provided. The bed is in the lowest position with wheels locked. The patient has been advised to notify staff, using the call light, if there is a need to get up or use restroom. The patient verbalized understanding of safety precautions and how to contact staff for assistance.       Nadir Acevedo RN  02/16/22 5296

## 2022-02-17 NOTE — PROGRESS NOTES
Pt resting in bed. Tele camera still in room. Pt to be leaving at 8:15pm tonight by [address]. Fall precautions in place. Call light within reach. Will monitor.  Electronically signed by Jami Camargo RN on 2/17/2022 at 4:51 PM

## 2022-02-17 NOTE — ED NOTES
Pt was found pulling on IV in right hand and IV is no longer patent. Removed.       Megan Reveles RN  02/16/22 2609

## 2022-02-17 NOTE — CONSULTS
Palliative Care Note    Chart reviewed, patient returning to [facility] with hospice services in place. Family agreeable. Please reconsult if services needed.     Electronically signed by Lencho MEDINA, RN, [facility] on 2/17/2022 at 2:53 PM  Palliative Care Nurse  Phone [phone]

## 2022-02-17 NOTE — PROGRESS NOTES
Patient arrived to room  from [place] ED. Patient is A/O to self only. Oriented patient to room and call light. Fall assessment completed, patient denies any fall history, but scores high on fall risk assessment, all fall precautions are put in place. Patient denies any further needs at this time, will continue to monitor and assess for needs and comfort.

## 2022-02-17 NOTE — PROGRESS NOTES
Report called to Mack Hollingsworth at [facility]. No further questions at this time. Will monitor pt per unit protocol up until discharge tonight.  Electronically signed by Jagdeep Guardado RN on 2/17/2022 at 6:47 PM

## 2022-02-17 NOTE — H&P
Hospital Medicine History & Physical      PCP: Amada Tobyjoe    Date of Admission: 2/16/2022    Chief Complaint:  Constipation    History Of Present Illness:    Patient is not able to provide history due to dementia, patient is under hospice care at [facility], patient was found to have no BMs for 4 days, patient was sent to hospital for further evaluation. Patient was found to have SBO at [facility]. Patient at bedside, do not recommend or wishes any treatments and just wants to make her comfortable. Past Medical History:      No past medical history on file. Past Surgical History:      No past surgical history on file. Medications Prior to Admission:      Prior to Admission medications    Medication Sig Start Date End Date Taking?  Authorizing Provider   amiodarone (CORDARONE) 200 MG tablet Take 200 mg by mouth daily   Yes Historical Provider, MD   ferrous sulfate (IRON 325) 325 (65 Fe) MG tablet Take 325 mg by mouth daily (with breakfast)   Yes Historical Provider, MD   furosemide (LASIX) 20 MG tablet Take 60 mg by mouth daily   Yes Historical Provider, MD   loperamide (IMODIUM) 2 MG capsule Take 2 mg by mouth 2 times daily as needed for Diarrhea   Yes Historical Provider, MD   Multiple Vitamins-Minerals (THERAPEUTIC MULTIVITAMIN-MINERALS) tablet Take 1 tablet by mouth daily   Yes Historical Provider, MD   pantoprazole (PROTONIX) 40 MG tablet Take 40 mg by mouth daily   Yes Historical Provider, MD   promethazine (PHENERGAN) 12.5 MG suppository Place 12.5 mg rectally every 6 hours as needed for Nausea   Yes Historical Provider, MD   potassium chloride (KLOR-CON M) 10 MEQ extended release tablet Take 10 mEq by mouth 2 times daily   Yes Historical Provider, MD   QUEtiapine (SEROQUEL) 25 MG tablet Take 25 mg by mouth daily   Yes Historical Provider, MD   QUEtiapine (SEROQUEL) 50 MG tablet Take 50 mg by mouth at bedtime   Yes Historical Provider, MD   acetaminophen (TYLENOL) 325 MG tablet Take 650 mg by mouth every 6 hours as needed for Pain   Yes Historical Provider, MD   vitamin D 25 MCG (1000 UT) CAPS Take 1,000 Units by mouth daily   Yes Historical Provider, MD       Allergies:  Fenoprofen    Social History:      TOBACCO:   has no history on file for tobacco use. ETOH:   has no history on file for alcohol use. Family History:       Reviewed in detail and non contributory      No family history on file. REVIEW OF SYSTEMS:   Pertinent positives as noted in the HPI. All other systems reviewed and negative. PHYSICAL EXAM PERFORMED:    BP (!) 111/50   Pulse 86   Temp 97.5 °F (36.4 °C) (Oral)   Resp 18   Wt 158 lb 1.1 oz (71.7 kg)   SpO2 97%     General appearance:  No apparent distress, non-cooperative, confused  HEENT:  Normal cephalic, atraumatic without obvious deformity. Conjunctivae/corneas clear. Neck: Supple, with full range of motion. No cervical lymphadenopathy  Respiratory:  Normal respiratory effort. Clear to auscultation, bilaterally without Rales/Wheezes/Rhonchi. Cardiovascular:  Regular rate and rhythm with normal S1/S2 without murmurs, rubs or gallops. Abdomen: Soft, non-tender, non-distended, normal bowel sounds. Musculoskeletal:  No edema noted bilaterally. No tenderness on palpation   Skin: no rash visible  Neurologic:  Not following commands  Psychiatric:  Alert and oriented x0, normal mood  Peripheral Pulses: +2 palpable, equal bilaterally       Labs:     Recent Labs     02/16/22  1207   WBC 15.9*   HGB 11.3*   HCT 36.3   *     Recent Labs     02/16/22  1207      K 4.3   CL 98*   CO2 23   BUN 92*   CREATININE 2.7*   CALCIUM 8.3     Recent Labs     02/16/22  1207   AST 21   ALT 11   BILIDIR <0.2   BILITOT 0.5   ALKPHOS 229*     No results for input(s): INR in the last 72 hours.   Recent Labs     02/16/22  1207   TROPONINI 0.02*       Urinalysis:      Lab Results   Component Value Date    NITRU Negative 02/16/2022    WBCUA 39 02/16/2022    BACTERIA 4+ 01/23/2022    RBCUA 5-10 02/16/2022    BLOODU Negative 02/16/2022    SPECGRAV 1.019 02/16/2022    GLUCOSEU Negative 02/16/2022       Radiology:       CT Head WO Contrast   Final Result   No acute intracranial abnormality. No change from prior study. CT ABDOMEN PELVIS WO CONTRAST Additional Contrast? None   Final Result   Highly suspicious mass involving the ileocecal junction with surrounding   nodularity presumed metastatic localized adenopathy. 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 In addition, this likely accounts for the small-bowel obstruction extending   to the terminal ileum. NG tube for decompression and GI consultation recommended. Patient is not able to provide history due to dementia, patient is under hospice care at [facility], patient was found to have no BMs for 4 days, patient was sent to hospital for further evaluation. Patient was found to have SBO at [facility]. Patient at bedside, do not recommend or wishes any treatments and just wants to make her comfortable. Assessment  Constipation, Ileus  Dementia  Hospice care    Plan   Comfort care orders per family, ok with IV fluids, family and POA do not wish any surgical or GI consults  Consult Palliative care, Hospice care  Diet: No diet orders on file  Code Status: No Order    PT/OT Eval Status: ordered    Dispo - pending clinical improvement       Leonela Santana MD    The note was completed using EMR and Dragon dictation system. Every effort was made to ensure accuracy; however, inadvertent computerized transcription errors may be present. Thank you Jairon Schroeder for the opportunity to be involved in this patient's care. If you have any questions or concerns please feel free to contact me at [phone].     Leonela Santana MD

## 2022-02-17 NOTE — PROGRESS NOTES
A&O to self only; disoriented to place, time and situation. Pt resting in bed. Pt NPO, incontinent of urine and stool, no c/o of pain. Fall precautions in place. Call light within reach. Will monitor.  Electronically signed by Katy Lombardo RN on 2/17/2022 at 9:49 AM

## 2022-02-17 NOTE — DISCHARGE SUMMARY
Hospital Medicine Discharge Summary    Patient ID: Krystyna Argueta      Patient's PCP: Aicha Schulz    Admit Date: 2/16/2022     Discharge Date:   02/17/22      Admitting Provider: Jd Schroeder MD     Discharge Provider: Bismark Romo MD     Discharge Diagnoses: There are no active hospital problems to display for this patient. The patient was seen and examined on day of discharge and this discharge summary is in conjunction with any daily progress note from day of discharge. Hospital Course:   70-year-old female patient with multiple comorbidities including dementia, was established with hospice at [facility], presented to emergency with 4 days of constipation and possible intestinal obstruction. Comfort care. Case was discussed in length with the patient's daughter as well as other family members, will continue comfort measures at nursing home including morphine and Ativan. Patient will likely not benefit from antibiotics, no role for any intervention given CODE STATUS comorbidities. Family are agreeable on comfort care measures, patient will be given scripts for morphine and Ativan to be administered at nursing home. Physical Exam Performed:     /60   Pulse 72   Temp 97.4 °F (36.3 °C) (Oral)   Resp 18   Wt 159 lb 2.8 oz (72.2 kg)   SpO2 95%       General appearance: Confused, seems to be comfortable HEENT:  Normal cephalic, atraumatic without obvious deformity. Pupils equal, round, and reactive to light. Extra ocular muscles intact. Conjunctivae/corneas clear. Neck: Supple, with full range of motion. No jugular venous distention. Trachea midline. Respiratory:  Normal respiratory effort. Clear to auscultation, bilaterally without Rales/Wheezes/Rhonchi. Cardiovascular:  Regular rate and rhythm with normal S1/S2 without murmurs, rubs or gallops. Abdomen: Soft, non-tender, non-distended with normal bowel sounds.   Musculoskeletal:  No clubbing, cyanosis or edema bilaterally. Full range of motion without deformity. Skin: Skin color, texture, turgor normal.  No rashes or lesions. Neurologic:  Neurovascularly intact without any focal sensory/motor deficits. Cranial nerves: II-XII intact, grossly non-focal.  Psychiatric: Confused capillary Refill: Brisk,< 3 seconds   Peripheral Pulses: +2 palpable, equal bilaterally       Labs: For convenience and continuity at follow-up the following most recent labs are provided:      CBC:    Lab Results   Component Value Date    WBC 15.9 02/16/2022    HGB 11.3 02/16/2022    HCT 36.3 02/16/2022     02/16/2022       Renal:    Lab Results   Component Value Date     02/16/2022    K 4.3 02/16/2022    CL 98 02/16/2022    CO2 23 02/16/2022    BUN 92 02/16/2022    CREATININE 2.7 02/16/2022    CALCIUM 8.3 02/16/2022         Significant Diagnostic Studies    Radiology:   CT Head WO Contrast   Final Result   No acute intracranial abnormality. No change from prior study. CT ABDOMEN PELVIS WO CONTRAST Additional Contrast? None   Final Result   Highly suspicious mass involving the ileocecal junction with surrounding   nodularity presumed metastatic localized adenopathy. 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 In addition, this likely accounts for the small-bowel obstruction extending   to the terminal ileum. NG tube for decompression and GI consultation recommended.                 Consults:     IP CONSULT TO PALLIATIVE CARE  IP CONSULT TO HOSPICE  IP CONSULT TO PALLIATIVE CARE  IP CONSULT TO HOSPICE  IP CONSULT TO HOSPICE    Disposition: SNF with hospice care    Condition at Discharge: Terminal    Discharge Instructions/Follow-up:    -Morphine 10 mg sublingual every 2 hours for pain.  -Ativan 1 mg sublingual every 4 hours for anxiety    Code Status:  DNR-CC     Activity: activity as tolerated    Diet: clear liquids      Discharge Medications:     Current Discharge Medication List           Details   morphine sulfate 20 MG/ML concentrated oral solution Take 0.5 mLs by mouth every 2 hours as needed for Pain for up to 3 days. Qty: 30 mL, Refills: 0    Comments: Reduce doses taken as pain becomes manageable  Associated Diagnoses: Hospice care      LORazepam (ATIVAN) 2 MG/ML concentrated solution Take 0.5 mLs by mouth every 4 hours as needed (anxiety) for up to 3 days. Qty: 30 mL, Refills: 0    Associated Diagnoses: Hospice care             Time Spent on discharge is more than 30 minutes in the examination, evaluation, counseling and review of medications and discharge plan. Signed:    Madai Tejada MD   2/17/2022      Thank you Ramin Donis for the opportunity to be involved in this patient's care. If you have any questions or concerns please feel free to contact me at [phone].

## 2022-02-17 NOTE — CARE COORDINATION
Reviewed chart. Patient is from [facility] with [facility] ([address] [place] [address]). Spoke with Eugenio Bethea at [facility]. Patient is from long term care and they can accept patient back. She also confirmed that daughter would be able to visit patient at anytime. Received call from Javed Salas at [address] [place] [address]. She reported patient's daughter was out of town and is on her way home from [place], [place]. Javed Salas reported that the plan is for patient to remain at the hospital until daughter arrives later tomorrow or the next day. Informed Javed Salas that attending MD will most likely discharge patient today. 1:21 PM  Aware from care rounds that patient is to be discharged. Spoke with Javed Salas at [address] [place] [address] regarding the discharge. Spoke with daughter, Raúl Gonsalves, regarding possible discharge today. She is concerned patient may pass if she is discharged from hospital. Daughter does not expect to arrive to [place] until later tonight. She has questions about patient's condition and would like to speak with attending MD. Madisyn Code message to attending regarding above. Electronically signed by SAVANNAH Adorno LISW, Case Management on 2/17/2022 at 1:34 PM  [facility] [id number]    1:47 PM  Spoke with attending MD. He requested that Sw clarify with [address] [place] [address] if patient receives pain medication at [facility]. Spoke with Javed Salas at [address] [place] [address]. She reported patient was just admitted to [address] [place] [address] On 2/14/22 and was only on Tylenol. She reported the following:    She stated discharge scripts should be written for: morphine 20mg per ml concentrate Sublingual recommend 5-10 mg every 2 hours as needed for pain. Lorezapam concentrate 0.5 mL = 1 mg of 2 mg/mL Sublingual every 4 hours prn. Sent message to attending. Patient will need a covid test to return to [facility]. Rn aware. Electronically signed by SAVANNAH Adorno LISW, Case Management on 2/17/2022 at 2:11 PM  [facility] [id number]    2:34 PM  Aware of patient discharge.  Left non-identifying message for patient's daughter, Raúl Gonsalves, regarding discharge, attending Md plan to write scripts for pain med and 815 transport time. Informed Elizabeth at [address] [place] [address] of patient's discharge. Spoke with Eugenio Bethea at [facility] regarding discharge. She reported they can accept the patient. They will place patient in a private room--Room 325. She also reported daughter may visit patient at anytime.      Electronically signed by Haylee Nichols, SAVANNAH, YUNI, Case Management on 2/17/2022 at 2:38 PM  [facility] [id number]

## 2022-02-17 NOTE — PLAN OF CARE
Problem: Skin Integrity:  Goal: Will show no infection signs and symptoms  Description: Will show no infection signs and symptoms  Outcome: Ongoing  Note: Will monitor skin and mucous members. Will turn patient every 2 hours, monitor for friction and sheering, and change dressings as needed. Will preform skin assessment every shift. Goal: Absence of new skin breakdown  Description: Absence of new skin breakdown  Outcome: Ongoing  Note: Will monitor skin and mucous members. Will turn patient every 2 hours, monitor for friction and sheering, and change dressings as needed. Will preform skin assessment every shift. Problem: Falls - Risk of:  Goal: Will remain free from falls  Description: Will remain free from falls  Outcome: Ongoing  Note: Patient educated on fall prevention. Call light is within reach, bed locked in lowest position, personal items within reach, and bed alarm is on. Will round on patient per unit guidelines. Goal: Absence of physical injury  Description: Absence of physical injury  Outcome: Ongoing  Note: Pt assessed for fall risk and fall precautions put into place. Bed in lowest position and wheels locked, call light within reach. Nonskid footwear in place. Patient educated on appropriate method of transfer and to call for assistance. Problem: Pain:  Goal: Pain level will decrease  Description: Pain level will decrease  Outcome: Ongoing  Note: Educated patient on pain management. Will assess patients pain level per unit protocol, and provide pain management measures as needed. Goal: Control of acute pain  Description: Control of acute pain  Outcome: Ongoing  Note: Patient educated on acute pain. Taught patient to use call light to ask for pain medication. PRN pain medication given for acute pain. Will continue to monitor pain per unit protocol.      Goal: Control of chronic pain  Description: Control of chronic pain  Outcome: Ongoing  Note: Patient educated on chronic pain. Taught patient to use call light to ask for pain medication. Will continue to monitor pain per unit protocol.

## 2022-02-18 NOTE — PROGRESS NOTES
Pt discharged to [facility]. [address] to transport pt via ambulance. Discharge instructions, Rx, and personal belongings given to pt. Explanation of discharge medications and instructions understood by verbal statement. No questions, comments or concerns at this time.

## 2022-02-20 LAB
BLOOD CULTURE, ROUTINE: NORMAL
CULTURE, BLOOD 2: NORMAL